# Patient Record
Sex: MALE | Race: WHITE | NOT HISPANIC OR LATINO | Employment: OTHER | URBAN - METROPOLITAN AREA
[De-identification: names, ages, dates, MRNs, and addresses within clinical notes are randomized per-mention and may not be internally consistent; named-entity substitution may affect disease eponyms.]

---

## 2019-08-07 ENCOUNTER — OFFICE VISIT CONVERTED (OUTPATIENT)
Dept: PULMONOLOGY | Facility: CLINIC | Age: 69
End: 2019-08-07
Attending: INTERNAL MEDICINE

## 2019-08-22 ENCOUNTER — HOSPITAL ENCOUNTER (OUTPATIENT)
Dept: CARDIOLOGY | Facility: HOSPITAL | Age: 69
Discharge: HOME | End: 2019-08-22
Attending: INTERNAL MEDICINE

## 2019-08-22 LAB
ALBUMIN SERPL-MCNC: 4 G/DL (ref 3.5–5)
ALBUMIN/GLOB SERPL: 1.3 {RATIO} (ref 1.4–2.6)
ALP SERPL-CCNC: 84 U/L (ref 56–155)
ALT SERPL-CCNC: 12 U/L (ref 10–40)
ANION GAP SERPL CALC-SCNC: 17 MMOL/L (ref 8–19)
AST SERPL-CCNC: 15 U/L (ref 15–50)
BASOPHILS # BLD AUTO: 0.08 10*3/UL (ref 0–0.2)
BASOPHILS NFR BLD AUTO: 0.7 % (ref 0–3)
BILIRUB SERPL-MCNC: 0.41 MG/DL (ref 0.2–1.3)
BUN SERPL-MCNC: 13 MG/DL (ref 5–25)
BUN/CREAT SERPL: 15 {RATIO} (ref 6–20)
CALCIUM SERPL-MCNC: 9.4 MG/DL (ref 8.7–10.4)
CHLORIDE SERPL-SCNC: 104 MMOL/L (ref 99–111)
CONV ABS IMM GRAN: 0.11 10*3/UL (ref 0–0.2)
CONV CO2: 23 MMOL/L (ref 22–32)
CONV IMMATURE GRAN: 1 % (ref 0–1.8)
CONV TOTAL PROTEIN: 7.1 G/DL (ref 6.3–8.2)
CREAT UR-MCNC: 0.88 MG/DL (ref 0.7–1.2)
DEPRECATED RDW RBC AUTO: 48.9 FL (ref 35.1–43.9)
EOSINOPHIL # BLD AUTO: 0.04 10*3/UL (ref 0–0.7)
EOSINOPHIL # BLD AUTO: 0.4 % (ref 0–7)
ERYTHROCYTE [DISTWIDTH] IN BLOOD BY AUTOMATED COUNT: 13.6 % (ref 11.6–14.4)
GFR SERPLBLD BASED ON 1.73 SQ M-ARVRAT: >60 ML/MIN/{1.73_M2}
GLOBULIN UR ELPH-MCNC: 3.1 G/DL (ref 2–3.5)
GLUCOSE SERPL-MCNC: 129 MG/DL (ref 70–99)
HCT VFR BLD AUTO: 43.9 % (ref 42–52)
HGB BLD-MCNC: 14.2 G/DL (ref 14–18)
LYMPHOCYTES # BLD AUTO: 2.39 10*3/UL (ref 1–5)
LYMPHOCYTES NFR BLD AUTO: 21.8 % (ref 20–45)
MCH RBC QN AUTO: 31.6 PG (ref 27–31)
MCHC RBC AUTO-ENTMCNC: 32.3 G/DL (ref 33–37)
MCV RBC AUTO: 97.8 FL (ref 80–96)
MONOCYTES # BLD AUTO: 0.65 10*3/UL (ref 0.2–1.2)
MONOCYTES NFR BLD AUTO: 5.9 % (ref 3–10)
NEUTROPHILS # BLD AUTO: 7.71 10*3/UL (ref 2–8)
NEUTROPHILS NFR BLD AUTO: 70.2 % (ref 30–85)
NRBC CBCN: 0 % (ref 0–0.7)
OSMOLALITY SERPL CALC.SUM OF ELEC: 292 MOSM/KG (ref 273–304)
PLATELET # BLD AUTO: 257 10*3/UL (ref 130–400)
PMV BLD AUTO: 9.5 FL (ref 9.4–12.4)
POTASSIUM SERPL-SCNC: 4.2 MMOL/L (ref 3.5–5.3)
RBC # BLD AUTO: 4.49 10*6/UL (ref 4.7–6.1)
SODIUM SERPL-SCNC: 140 MMOL/L (ref 135–147)
WBC # BLD AUTO: 10.98 10*3/UL (ref 4.8–10.8)

## 2019-09-27 ENCOUNTER — OFFICE VISIT CONVERTED (OUTPATIENT)
Dept: PULMONOLOGY | Facility: CLINIC | Age: 69
End: 2019-09-27
Attending: INTERNAL MEDICINE

## 2020-01-14 ENCOUNTER — OFFICE VISIT CONVERTED (OUTPATIENT)
Dept: PULMONOLOGY | Facility: CLINIC | Age: 70
End: 2020-01-14
Attending: INTERNAL MEDICINE

## 2020-03-11 ENCOUNTER — HOSPITAL ENCOUNTER (OUTPATIENT)
Dept: GENERAL RADIOLOGY | Facility: HOSPITAL | Age: 70
Discharge: HOME | End: 2020-03-11
Attending: INTERNAL MEDICINE

## 2020-04-15 ENCOUNTER — OFFICE VISIT CONVERTED (OUTPATIENT)
Dept: PULMONOLOGY | Facility: CLINIC | Age: 70
End: 2020-04-15
Attending: INTERNAL MEDICINE

## 2020-05-14 ENCOUNTER — OFFICE VISIT CONVERTED (OUTPATIENT)
Dept: PULMONOLOGY | Facility: CLINIC | Age: 70
End: 2020-05-14
Attending: PHYSICIAN ASSISTANT

## 2020-06-30 ENCOUNTER — HOSPITAL ENCOUNTER (OUTPATIENT)
Dept: GENERAL RADIOLOGY | Facility: HOSPITAL | Age: 70
Discharge: HOME | End: 2020-06-30
Attending: INTERNAL MEDICINE

## 2020-06-30 ENCOUNTER — OFFICE VISIT CONVERTED (OUTPATIENT)
Dept: PULMONOLOGY | Facility: CLINIC | Age: 70
End: 2020-06-30
Attending: INTERNAL MEDICINE

## 2020-07-01 ENCOUNTER — HOSPITAL ENCOUNTER (OUTPATIENT)
Dept: LAB | Facility: HOSPITAL | Age: 70
Discharge: HOME | End: 2020-07-01
Attending: INTERNAL MEDICINE

## 2020-07-01 LAB
ALBUMIN SERPL-MCNC: 3.8 G/DL (ref 3.5–5)
ALBUMIN/GLOB SERPL: 1.5 {RATIO} (ref 1.4–2.6)
ALP SERPL-CCNC: 91 U/L (ref 56–155)
ALT SERPL-CCNC: 7 U/L (ref 10–40)
ANION GAP SERPL CALC-SCNC: 18 MMOL/L (ref 8–19)
AST SERPL-CCNC: 10 U/L (ref 15–50)
BILIRUB SERPL-MCNC: 0.19 MG/DL (ref 0.2–1.3)
BUN SERPL-MCNC: 12 MG/DL (ref 5–25)
BUN/CREAT SERPL: 12 {RATIO} (ref 6–20)
CALCIUM SERPL-MCNC: 9.5 MG/DL (ref 8.7–10.4)
CHLORIDE SERPL-SCNC: 104 MMOL/L (ref 99–111)
CONV CO2: 24 MMOL/L (ref 22–32)
CONV TOTAL PROTEIN: 6.4 G/DL (ref 6.3–8.2)
CREAT UR-MCNC: 0.99 MG/DL (ref 0.7–1.2)
GFR SERPLBLD BASED ON 1.73 SQ M-ARVRAT: >60 ML/MIN/{1.73_M2}
GLOBULIN UR ELPH-MCNC: 2.6 G/DL (ref 2–3.5)
GLUCOSE SERPL-MCNC: 169 MG/DL (ref 70–99)
OSMOLALITY SERPL CALC.SUM OF ELEC: 298 MOSM/KG (ref 273–304)
POTASSIUM SERPL-SCNC: 4 MMOL/L (ref 3.5–5.3)
SODIUM SERPL-SCNC: 142 MMOL/L (ref 135–147)

## 2020-07-04 LAB
BACTERIA SPEC AEROBE CULT: ABNORMAL
CEFEPIME SUSC ISLT: 2
CEFOTAXIME SUSC ISLT.MENING: 1
CEFOTAXIME SUSC ISLT: 1
CEFTAZIDIME SUSC ISLT: 4
CEFTRIAXONE SUSC ISLT.MENING: 1
CEFTRIAXONE SUSC ISLT: 1
CIPROFLOXACIN SUSC ISLT: <=0.25
CLINDAMYCIN SUSC ISLT: >=1
ERYTHROMYCIN SUSC ISLT: >=8
GENTAMICIN SUSC ISLT: 8
LEVOFLOXACIN SUSC ISLT: 0.5
LEVOFLOXACIN SUSC ISLT: 0.5
TETRACYCLINE SUSC ISLT: <=0.25
TIGECYCLINE SUSC ISLT: <=0.06
TMP SMX SUSC ISLT: <=10
TOBRAMYCIN SUSC ISLT: <=1
VANCOMYCIN SUSC ISLT: 0.5

## 2020-07-09 ENCOUNTER — OFFICE VISIT CONVERTED (OUTPATIENT)
Dept: PULMONOLOGY | Facility: CLINIC | Age: 70
End: 2020-07-09
Attending: NURSE PRACTITIONER

## 2020-08-17 ENCOUNTER — HOSPITAL ENCOUNTER (OUTPATIENT)
Dept: GENERAL RADIOLOGY | Facility: HOSPITAL | Age: 70
Discharge: HOME | End: 2020-08-17
Attending: INTERNAL MEDICINE

## 2020-08-17 LAB
CREAT BLD-MCNC: 0.9 MG/DL (ref 0.6–1.4)
GFR SERPLBLD BASED ON 1.73 SQ M-ARVRAT: >60 ML/MIN/{1.73_M2}

## 2020-08-28 ENCOUNTER — OFFICE VISIT CONVERTED (OUTPATIENT)
Dept: PULMONOLOGY | Facility: CLINIC | Age: 70
End: 2020-08-28
Attending: INTERNAL MEDICINE

## 2021-01-01 ENCOUNTER — HOSPITAL ENCOUNTER (OUTPATIENT)
Dept: CT IMAGING | Facility: HOSPITAL | Age: 71
Discharge: HOME | End: 2021-08-03
Admitting: NURSE PRACTITIONER

## 2021-01-01 ENCOUNTER — ANESTHESIA (OUTPATIENT)
Dept: GASTROENTEROLOGY | Facility: HOSPITAL | Age: 71
End: 2021-01-01

## 2021-01-01 ENCOUNTER — HOSPITAL ENCOUNTER (OUTPATIENT)
Dept: CT IMAGING | Facility: HOSPITAL | Age: 71
Discharge: HOME | End: 2021-10-01

## 2021-01-01 ENCOUNTER — TELEPHONE (OUTPATIENT)
Dept: GASTROENTEROLOGY | Facility: CLINIC | Age: 71
End: 2021-01-01

## 2021-01-01 ENCOUNTER — HOSPITAL ENCOUNTER (OUTPATIENT)
Facility: HOSPITAL | Age: 71
Setting detail: HOSPITAL OUTPATIENT SURGERY
Discharge: HOME | End: 2021-12-23
Attending: INTERNAL MEDICINE | Admitting: INTERNAL MEDICINE

## 2021-01-01 ENCOUNTER — OFFICE VISIT (OUTPATIENT)
Dept: PULMONOLOGY | Facility: CLINIC | Age: 71
End: 2021-01-01

## 2021-01-01 ENCOUNTER — ANESTHESIA EVENT (OUTPATIENT)
Dept: GASTROENTEROLOGY | Facility: HOSPITAL | Age: 71
End: 2021-01-01

## 2021-01-01 ENCOUNTER — APPOINTMENT (OUTPATIENT)
Dept: CT IMAGING | Facility: HOSPITAL | Age: 71
End: 2021-01-01

## 2021-01-01 ENCOUNTER — LAB (OUTPATIENT)
Dept: LAB | Facility: HOSPITAL | Age: 71
End: 2021-01-01

## 2021-01-01 ENCOUNTER — PREP FOR SURGERY (OUTPATIENT)
Dept: OTHER | Facility: HOSPITAL | Age: 71
End: 2021-01-01

## 2021-01-01 ENCOUNTER — HOSPITAL ENCOUNTER (OUTPATIENT)
Dept: CARDIOLOGY | Facility: HOSPITAL | Age: 71
Discharge: HOME | End: 2021-10-01

## 2021-01-01 ENCOUNTER — OFFICE VISIT (OUTPATIENT)
Dept: GASTROENTEROLOGY | Facility: CLINIC | Age: 71
End: 2021-01-01

## 2021-01-01 VITALS
HEIGHT: 72 IN | SYSTOLIC BLOOD PRESSURE: 146 MMHG | WEIGHT: 138.89 LBS | TEMPERATURE: 97.7 F | HEART RATE: 74 BPM | OXYGEN SATURATION: 94 % | BODY MASS INDEX: 18.81 KG/M2 | DIASTOLIC BLOOD PRESSURE: 88 MMHG | RESPIRATION RATE: 16 BRPM

## 2021-01-01 VITALS
DIASTOLIC BLOOD PRESSURE: 58 MMHG | SYSTOLIC BLOOD PRESSURE: 132 MMHG | HEIGHT: 73 IN | TEMPERATURE: 98.2 F | HEART RATE: 67 BPM | RESPIRATION RATE: 14 BRPM | BODY MASS INDEX: 17.36 KG/M2 | WEIGHT: 131 LBS | OXYGEN SATURATION: 97 %

## 2021-01-01 VITALS
HEIGHT: 73 IN | BODY MASS INDEX: 17.23 KG/M2 | WEIGHT: 130 LBS | RESPIRATION RATE: 14 BRPM | OXYGEN SATURATION: 96 % | HEART RATE: 90 BPM | SYSTOLIC BLOOD PRESSURE: 145 MMHG | DIASTOLIC BLOOD PRESSURE: 70 MMHG

## 2021-01-01 VITALS
HEART RATE: 68 BPM | HEIGHT: 73 IN | DIASTOLIC BLOOD PRESSURE: 60 MMHG | SYSTOLIC BLOOD PRESSURE: 102 MMHG | BODY MASS INDEX: 17.97 KG/M2 | WEIGHT: 135.6 LBS

## 2021-01-01 DIAGNOSIS — R10.13 EPIGASTRIC PAIN: ICD-10-CM

## 2021-01-01 DIAGNOSIS — E43 SEVERE MALNUTRITION (HCC): ICD-10-CM

## 2021-01-01 DIAGNOSIS — J43.2 CENTRILOBULAR EMPHYSEMA (HCC): ICD-10-CM

## 2021-01-01 DIAGNOSIS — J43.2 CENTRILOBULAR EMPHYSEMA (HCC): Primary | ICD-10-CM

## 2021-01-01 DIAGNOSIS — R10.13 EPIGASTRIC PAIN: Primary | ICD-10-CM

## 2021-01-01 DIAGNOSIS — R06.89 AIRWAY CLEARANCE IMPAIRMENT: ICD-10-CM

## 2021-01-01 DIAGNOSIS — Z72.0 TOBACCO ABUSE: ICD-10-CM

## 2021-01-01 DIAGNOSIS — R42 DIZZINESS: ICD-10-CM

## 2021-01-01 DIAGNOSIS — R91.8 NONSPECIFIC ABNORMAL FINDING OF LUNG FIELD: ICD-10-CM

## 2021-01-01 DIAGNOSIS — R06.2 WHEEZING: ICD-10-CM

## 2021-01-01 DIAGNOSIS — R49.0 HOARSENESS: ICD-10-CM

## 2021-01-01 DIAGNOSIS — R63.4 UNINTENDED WEIGHT LOSS: ICD-10-CM

## 2021-01-01 DIAGNOSIS — K29.70 GASTRITIS, HELICOBACTER PYLORI: Primary | ICD-10-CM

## 2021-01-01 DIAGNOSIS — R63.4 UNINTENTIONAL WEIGHT LOSS: ICD-10-CM

## 2021-01-01 DIAGNOSIS — N28.9 RENAL LESION: ICD-10-CM

## 2021-01-01 DIAGNOSIS — R19.4 CHANGE IN BOWEL HABIT: ICD-10-CM

## 2021-01-01 DIAGNOSIS — J96.21 ACUTE AND CHRONIC RESPIRATORY FAILURE WITH HYPOXIA (HCC): ICD-10-CM

## 2021-01-01 DIAGNOSIS — E43 SEVERE PROTEIN-CALORIE MALNUTRITION (HCC): ICD-10-CM

## 2021-01-01 DIAGNOSIS — R93.1 ABNORMAL ECHOCARDIOGRAM: ICD-10-CM

## 2021-01-01 DIAGNOSIS — R91.1 LUNG NODULE: Primary | ICD-10-CM

## 2021-01-01 DIAGNOSIS — B96.81 GASTRITIS, HELICOBACTER PYLORI: Primary | ICD-10-CM

## 2021-01-01 DIAGNOSIS — T17.500A MUCUS PLUGGING OF BRONCHI: ICD-10-CM

## 2021-01-01 DIAGNOSIS — R06.09 CHRONIC DYSPNEA: ICD-10-CM

## 2021-01-01 DIAGNOSIS — J44.9 CHRONIC OBSTRUCTIVE PULMONARY DISEASE, UNSPECIFIED COPD TYPE (HCC): Primary | ICD-10-CM

## 2021-01-01 LAB
ALBUMIN SERPL-MCNC: 4.2 G/DL (ref 3.5–5.2)
ALBUMIN/GLOB SERPL: 1.6 G/DL
ALP SERPL-CCNC: 81 U/L (ref 39–117)
ALT SERPL W P-5'-P-CCNC: 6 U/L (ref 1–41)
ANION GAP SERPL CALCULATED.3IONS-SCNC: 5.5 MMOL/L (ref 5–15)
AST SERPL-CCNC: 13 U/L (ref 1–40)
BASOPHILS # BLD AUTO: 0.09 10*3/MM3 (ref 0–0.2)
BASOPHILS NFR BLD AUTO: 1 % (ref 0–1.5)
BH CV ECHO MEAS - AO MAX PG: 8 MMHG
BH CV ECHO MEAS - AO MEAN PG: 4 MMHG
BH CV ECHO MEAS - AO ROOT DIAM: 3.4 CM
BH CV ECHO MEAS - AO V2 MAX: 143 CM/SEC
BH CV ECHO MEAS - AO V2 VTI: 34.2 CM
BH CV ECHO MEAS - EDV(MOD-SP2): 78 ML
BH CV ECHO MEAS - EDV(MOD-SP4): 77 ML
BH CV ECHO MEAS - EF(MOD-BP): 61.1 %
BH CV ECHO MEAS - ESV(MOD-SP2): 31 ML
BH CV ECHO MEAS - ESV(MOD-SP4): 30 ML
BH CV ECHO MEAS - IVSD: 0.7 CM
BH CV ECHO MEAS - LA DIMENSION(2D): 2.6 CM
BH CV ECHO MEAS - LAT PEAK E' VEL: 7.1 CM/SEC
BH CV ECHO MEAS - LV MAX PG: 5 MMHG
BH CV ECHO MEAS - LV MEAN PG: 2 MMHG
BH CV ECHO MEAS - LV V1 MAX: 109 CM/SEC
BH CV ECHO MEAS - LV V1 VTI: 23.7 CM
BH CV ECHO MEAS - LVIDD: 4.7 CM
BH CV ECHO MEAS - LVIDS: 2.9 CM
BH CV ECHO MEAS - LVOT DIAM: 2 CM
BH CV ECHO MEAS - LVPWD: 0.7 CM
BH CV ECHO MEAS - MED PEAK E' VEL: 5.98 CM/SEC
BH CV ECHO MEAS - MV A MAX VEL: 77 CM/SEC
BH CV ECHO MEAS - MV DEC TIME: 181 MSEC
BH CV ECHO MEAS - MV E MAX VEL: 107 CM/SEC
BH CV ECHO MEAS - MV E/A: 1.4
BH CV ECHO MEAS - RVDD: 2.5 CM
BH CV ECHO MEAS - TR MAX PG: 42 MMHG
BH CV ECHO MEASUREMENTS AVERAGE E/E' RATIO: 16.36
BILIRUB SERPL-MCNC: 0.5 MG/DL (ref 0–1.2)
BUN SERPL-MCNC: 12 MG/DL (ref 8–23)
BUN/CREAT SERPL: 11.3 (ref 7–25)
CALCIUM SPEC-SCNC: 9.5 MG/DL (ref 8.6–10.5)
CHLORIDE SERPL-SCNC: 104 MMOL/L (ref 98–107)
CO2 SERPL-SCNC: 27.5 MMOL/L (ref 22–29)
CREAT BLDA-MCNC: 0.9 MG/DL
CREAT SERPL-MCNC: 1.06 MG/DL (ref 0.76–1.27)
CYTO UR: NORMAL
DEPRECATED RDW RBC AUTO: 44.6 FL (ref 37–54)
EOSINOPHIL # BLD AUTO: 0.17 10*3/MM3 (ref 0–0.4)
EOSINOPHIL NFR BLD AUTO: 1.9 % (ref 0.3–6.2)
ERYTHROCYTE [DISTWIDTH] IN BLOOD BY AUTOMATED COUNT: 12 % (ref 12.3–15.4)
GFR SERPL CREATININE-BSD FRML MDRD: 69 ML/MIN/1.73
GLOBULIN UR ELPH-MCNC: 2.7 GM/DL
GLUCOSE SERPL-MCNC: 79 MG/DL (ref 65–99)
HCT VFR BLD AUTO: 45 % (ref 37.5–51)
HGB BLD-MCNC: 14.7 G/DL (ref 13–17.7)
IMM GRANULOCYTES # BLD AUTO: 0.06 10*3/MM3 (ref 0–0.05)
IMM GRANULOCYTES NFR BLD AUTO: 0.7 % (ref 0–0.5)
IVRT: 58 MSEC
LAB AP CASE REPORT: NORMAL
LAB AP CLINICAL INFORMATION: NORMAL
LEFT ATRIUM VOLUME INDEX: 15.1 ML/M2
LYMPHOCYTES # BLD AUTO: 2.98 10*3/MM3 (ref 0.7–3.1)
LYMPHOCYTES NFR BLD AUTO: 32.7 % (ref 19.6–45.3)
MAXIMAL PREDICTED HEART RATE: 149 BPM
MCH RBC QN AUTO: 32.5 PG (ref 26.6–33)
MCHC RBC AUTO-ENTMCNC: 32.7 G/DL (ref 31.5–35.7)
MCV RBC AUTO: 99.6 FL (ref 79–97)
MONOCYTES # BLD AUTO: 0.71 10*3/MM3 (ref 0.1–0.9)
MONOCYTES NFR BLD AUTO: 7.8 % (ref 5–12)
NEUTROPHILS NFR BLD AUTO: 5.11 10*3/MM3 (ref 1.7–7)
NEUTROPHILS NFR BLD AUTO: 55.9 % (ref 42.7–76)
NRBC BLD AUTO-RTO: 0 /100 WBC (ref 0–0.2)
PATH REPORT.FINAL DX SPEC: NORMAL
PATH REPORT.GROSS SPEC: NORMAL
PLATELET # BLD AUTO: 211 10*3/MM3 (ref 140–450)
PMV BLD AUTO: 10.3 FL (ref 6–12)
POTASSIUM SERPL-SCNC: 4.5 MMOL/L (ref 3.5–5.2)
PROT SERPL-MCNC: 6.9 G/DL (ref 6–8.5)
RBC # BLD AUTO: 4.52 10*6/MM3 (ref 4.14–5.8)
SODIUM SERPL-SCNC: 137 MMOL/L (ref 136–145)
STRESS TARGET HR: 127 BPM
WBC # BLD AUTO: 9.12 10*3/MM3 (ref 3.4–10.8)

## 2021-01-01 PROCEDURE — 71250 CT THORAX DX C-: CPT

## 2021-01-01 PROCEDURE — 25010000002 PROPOFOL 10 MG/ML EMULSION: Performed by: NURSE ANESTHETIST, CERTIFIED REGISTERED

## 2021-01-01 PROCEDURE — 82565 ASSAY OF CREATININE: CPT

## 2021-01-01 PROCEDURE — 45385 COLONOSCOPY W/LESION REMOVAL: CPT | Performed by: INTERNAL MEDICINE

## 2021-01-01 PROCEDURE — 93306 TTE W/DOPPLER COMPLETE: CPT | Performed by: INTERNAL MEDICINE

## 2021-01-01 PROCEDURE — 99214 OFFICE O/P EST MOD 30 MIN: CPT | Performed by: INTERNAL MEDICINE

## 2021-01-01 PROCEDURE — 99214 OFFICE O/P EST MOD 30 MIN: CPT | Performed by: NURSE PRACTITIONER

## 2021-01-01 PROCEDURE — 93306 TTE W/DOPPLER COMPLETE: CPT

## 2021-01-01 PROCEDURE — 74177 CT ABD & PELVIS W/CONTRAST: CPT

## 2021-01-01 PROCEDURE — 43239 EGD BIOPSY SINGLE/MULTIPLE: CPT | Performed by: INTERNAL MEDICINE

## 2021-01-01 PROCEDURE — 85025 COMPLETE CBC W/AUTO DIFF WBC: CPT

## 2021-01-01 PROCEDURE — 36415 COLL VENOUS BLD VENIPUNCTURE: CPT

## 2021-01-01 PROCEDURE — 80053 COMPREHEN METABOLIC PANEL: CPT

## 2021-01-01 PROCEDURE — 0 IOPAMIDOL PER 1 ML: Performed by: INTERNAL MEDICINE

## 2021-01-01 PROCEDURE — C1889 IMPLANT/INSERT DEVICE, NOC: HCPCS | Performed by: INTERNAL MEDICINE

## 2021-01-01 PROCEDURE — 99406 BEHAV CHNG SMOKING 3-10 MIN: CPT | Performed by: INTERNAL MEDICINE

## 2021-01-01 PROCEDURE — 25010000002 PHENYLEPHRINE 10 MG/ML SOLUTION: Performed by: NURSE ANESTHETIST, CERTIFIED REGISTERED

## 2021-01-01 PROCEDURE — 71250 CT THORAX DX C-: CPT | Performed by: RADIOLOGY

## 2021-01-01 PROCEDURE — 88305 TISSUE EXAM BY PATHOLOGIST: CPT | Performed by: INTERNAL MEDICINE

## 2021-01-01 DEVICE — DEV CLIP ENDO RESOLUTION360 CONTRL ROT 235CM: Type: IMPLANTABLE DEVICE | Site: COLON | Status: FUNCTIONAL

## 2021-01-01 RX ORDER — BUDESONIDE, GLYCOPYRROLATE, AND FORMOTEROL FUMARATE 160; 9; 4.8 UG/1; UG/1; UG/1
2 AEROSOL, METERED RESPIRATORY (INHALATION) 2 TIMES DAILY
Qty: 4 EACH | Refills: 0 | COMMUNITY
Start: 2021-01-01 | End: 2022-01-01

## 2021-01-01 RX ORDER — PROPOFOL 10 MG/ML
VIAL (ML) INTRAVENOUS AS NEEDED
Status: DISCONTINUED | OUTPATIENT
Start: 2021-01-01 | End: 2021-01-01 | Stop reason: SURG

## 2021-01-01 RX ORDER — PREDNISONE 1 MG/1
TABLET ORAL
COMMUNITY
Start: 2021-06-16 | End: 2021-01-01

## 2021-01-01 RX ORDER — FLUTICASONE PROPIONATE 50 MCG
SPRAY, SUSPENSION (ML) NASAL AS NEEDED
COMMUNITY
Start: 2021-01-01

## 2021-01-01 RX ORDER — MECLIZINE HYDROCHLORIDE 25 MG/1
25 TABLET ORAL 3 TIMES DAILY PRN
COMMUNITY
End: 2022-01-01

## 2021-01-01 RX ORDER — CLOPIDOGREL BISULFATE 75 MG/1
75 TABLET ORAL DAILY
COMMUNITY
Start: 2021-06-30

## 2021-01-01 RX ORDER — ETOMIDATE 2 MG/ML
INJECTION INTRAVENOUS AS NEEDED
Status: DISCONTINUED | OUTPATIENT
Start: 2021-01-01 | End: 2021-01-01 | Stop reason: SURG

## 2021-01-01 RX ORDER — TRIAMCINOLONE ACETONIDE 1 MG/G
CREAM TOPICAL
COMMUNITY
Start: 2021-05-25 | End: 2021-01-01

## 2021-01-01 RX ORDER — SULFAMETHOXAZOLE AND TRIMETHOPRIM 800; 160 MG/1; MG/1
TABLET ORAL
COMMUNITY
Start: 2021-05-25 | End: 2021-01-01

## 2021-01-01 RX ORDER — LABETALOL HYDROCHLORIDE 5 MG/ML
INJECTION, SOLUTION INTRAVENOUS AS NEEDED
Status: DISCONTINUED | OUTPATIENT
Start: 2021-01-01 | End: 2021-01-01 | Stop reason: SURG

## 2021-01-01 RX ORDER — BISMUTH SUBSALICYLATE 262 MG
524 TABLET,CHEWABLE ORAL 4 TIMES DAILY
Qty: 80 TABLET | Refills: 0 | Status: SHIPPED | OUTPATIENT
Start: 2021-01-01 | End: 2022-01-01

## 2021-01-01 RX ORDER — PANTOPRAZOLE SODIUM 40 MG/1
40 TABLET, DELAYED RELEASE ORAL DAILY
Qty: 30 TABLET | Refills: 5 | Status: SHIPPED | OUTPATIENT
Start: 2021-01-01 | End: 2022-01-01

## 2021-01-01 RX ORDER — ALBUTEROL SULFATE 2.5 MG/3ML
2.5 SOLUTION RESPIRATORY (INHALATION) EVERY 4 HOURS PRN
COMMUNITY

## 2021-01-01 RX ORDER — ROSUVASTATIN CALCIUM 10 MG/1
10 TABLET, COATED ORAL DAILY
COMMUNITY
Start: 2021-01-01

## 2021-01-01 RX ORDER — PREDNISONE 10 MG/1
10 TABLET ORAL DAILY
COMMUNITY
Start: 2021-01-01 | End: 2021-01-01

## 2021-01-01 RX ORDER — SILDENAFIL CITRATE 20 MG/1
TABLET ORAL
COMMUNITY
Start: 2021-01-01

## 2021-01-01 RX ORDER — METRONIDAZOLE 500 MG/1
500 TABLET ORAL 3 TIMES DAILY
Qty: 30 TABLET | Refills: 0 | Status: SHIPPED | OUTPATIENT
Start: 2021-01-01 | End: 2022-01-01

## 2021-01-01 RX ORDER — TRAZODONE HYDROCHLORIDE 300 MG/1
300 TABLET ORAL NIGHTLY
COMMUNITY
Start: 2021-01-01

## 2021-01-01 RX ORDER — PHENYLEPHRINE HYDROCHLORIDE 10 MG/ML
INJECTION INTRAVENOUS AS NEEDED
Status: DISCONTINUED | OUTPATIENT
Start: 2021-01-01 | End: 2021-01-01 | Stop reason: SURG

## 2021-01-01 RX ORDER — SIMVASTATIN 40 MG
TABLET ORAL
COMMUNITY
Start: 2021-01-01 | End: 2021-01-01 | Stop reason: ALTCHOICE

## 2021-01-01 RX ORDER — LIDOCAINE HYDROCHLORIDE 20 MG/ML
INJECTION, SOLUTION INFILTRATION; PERINEURAL AS NEEDED
Status: DISCONTINUED | OUTPATIENT
Start: 2021-01-01 | End: 2021-01-01 | Stop reason: SURG

## 2021-01-01 RX ORDER — CLONAZEPAM 0.5 MG/1
0.5 TABLET ORAL NIGHTLY PRN
COMMUNITY
Start: 2021-01-01

## 2021-01-01 RX ORDER — OMEPRAZOLE 20 MG/1
20 CAPSULE, DELAYED RELEASE ORAL DAILY
COMMUNITY
Start: 2021-01-01 | End: 2021-01-01 | Stop reason: HOSPADM

## 2021-01-01 RX ORDER — POLYETHYLENE GLYCOL 3350, SODIUM SULFATE ANHYDROUS, SODIUM BICARBONATE, SODIUM CHLORIDE, POTASSIUM CHLORIDE 227.1; 21.5; 6.36; 5.53; .754 G/L; G/L; G/L; G/L; G/L
4 POWDER, FOR SOLUTION ORAL DAILY
Qty: 1 EACH | Refills: 0 | Status: SHIPPED | OUTPATIENT
Start: 2021-01-01 | End: 2021-01-01

## 2021-01-01 RX ORDER — TETRACYCLINE HYDROCHLORIDE 500 MG/1
500 CAPSULE ORAL 3 TIMES DAILY
Qty: 30 CAPSULE | Refills: 0 | Status: SHIPPED | OUTPATIENT
Start: 2021-01-01 | End: 2022-01-01

## 2021-01-01 RX ORDER — SODIUM CHLORIDE, SODIUM LACTATE, POTASSIUM CHLORIDE, CALCIUM CHLORIDE 600; 310; 30; 20 MG/100ML; MG/100ML; MG/100ML; MG/100ML
30 INJECTION, SOLUTION INTRAVENOUS CONTINUOUS
Status: DISCONTINUED | OUTPATIENT
Start: 2021-01-01 | End: 2021-01-01 | Stop reason: HOSPADM

## 2021-01-01 RX ORDER — DILTIAZEM HYDROCHLORIDE 120 MG/1
120 CAPSULE, COATED, EXTENDED RELEASE ORAL DAILY
COMMUNITY
Start: 2021-01-01

## 2021-01-01 RX ORDER — PROPRANOLOL HYDROCHLORIDE 80 MG/1
80 TABLET ORAL 4 TIMES DAILY PRN
COMMUNITY
Start: 2021-01-01

## 2021-01-01 RX ORDER — HYDROCODONE BITARTRATE AND ACETAMINOPHEN 10; 325 MG/1; MG/1
1 TABLET ORAL EVERY 8 HOURS PRN
COMMUNITY
Start: 2021-01-01

## 2021-01-01 RX ADMIN — PROPOFOL 125 MCG/KG/MIN: 10 INJECTION, EMULSION INTRAVENOUS at 12:04

## 2021-01-01 RX ADMIN — IOPAMIDOL 100 ML: 755 INJECTION, SOLUTION INTRAVENOUS at 10:52

## 2021-01-01 RX ADMIN — PHENYLEPHRINE HYDROCHLORIDE 100 MCG: 10 INJECTION INTRAVENOUS at 12:38

## 2021-01-01 RX ADMIN — PROPOFOL 50 MG: 10 INJECTION, EMULSION INTRAVENOUS at 12:04

## 2021-01-01 RX ADMIN — ETOMIDATE 10 MG: 40 INJECTION, SOLUTION INTRAVENOUS at 12:16

## 2021-01-01 RX ADMIN — LABETALOL 20 MG/4 ML (5 MG/ML) INTRAVENOUS SYRINGE 10 MG: at 12:14

## 2021-01-01 RX ADMIN — ETOMIDATE 10 MG: 40 INJECTION, SOLUTION INTRAVENOUS at 12:20

## 2021-01-01 RX ADMIN — SODIUM CHLORIDE, POTASSIUM CHLORIDE, SODIUM LACTATE AND CALCIUM CHLORIDE 30 ML/HR: 600; 310; 30; 20 INJECTION, SOLUTION INTRAVENOUS at 10:54

## 2021-01-01 RX ADMIN — LIDOCAINE HYDROCHLORIDE 100 MG: 20 INJECTION, SOLUTION INFILTRATION; PERINEURAL at 12:03

## 2021-02-25 ENCOUNTER — OFFICE VISIT CONVERTED (OUTPATIENT)
Dept: PULMONOLOGY | Facility: CLINIC | Age: 71
End: 2021-02-25
Attending: NURSE PRACTITIONER

## 2021-05-22 ENCOUNTER — TRANSCRIBE ORDERS (OUTPATIENT)
Dept: PULMONOLOGY | Facility: CLINIC | Age: 71
End: 2021-05-22

## 2021-05-22 DIAGNOSIS — R91.8 NONSPECIFIC ABNORMAL FINDING OF LUNG FIELD: Primary | ICD-10-CM

## 2021-05-28 VITALS
HEIGHT: 73 IN | HEART RATE: 76 BPM | OXYGEN SATURATION: 96 % | WEIGHT: 132 LBS | DIASTOLIC BLOOD PRESSURE: 60 MMHG | SYSTOLIC BLOOD PRESSURE: 111 MMHG | TEMPERATURE: 98.2 F | RESPIRATION RATE: 14 BRPM | BODY MASS INDEX: 17.49 KG/M2

## 2021-05-28 VITALS
SYSTOLIC BLOOD PRESSURE: 109 MMHG | OXYGEN SATURATION: 96 % | BODY MASS INDEX: 17.24 KG/M2 | TEMPERATURE: 98.1 F | RESPIRATION RATE: 12 BRPM | RESPIRATION RATE: 16 BRPM | HEART RATE: 74 BPM | RESPIRATION RATE: 15 BRPM | SYSTOLIC BLOOD PRESSURE: 92 MMHG | HEART RATE: 67 BPM | OXYGEN SATURATION: 95 % | SYSTOLIC BLOOD PRESSURE: 141 MMHG | BODY MASS INDEX: 17.23 KG/M2 | TEMPERATURE: 98.2 F | DIASTOLIC BLOOD PRESSURE: 64 MMHG | SYSTOLIC BLOOD PRESSURE: 124 MMHG | HEIGHT: 73 IN | BODY MASS INDEX: 17.49 KG/M2 | RESPIRATION RATE: 14 BRPM | HEART RATE: 91 BPM | DIASTOLIC BLOOD PRESSURE: 67 MMHG | HEIGHT: 73 IN | OXYGEN SATURATION: 96 % | WEIGHT: 132 LBS | HEIGHT: 73 IN | BODY MASS INDEX: 17.36 KG/M2 | DIASTOLIC BLOOD PRESSURE: 74 MMHG | DIASTOLIC BLOOD PRESSURE: 59 MMHG | TEMPERATURE: 98.3 F | SYSTOLIC BLOOD PRESSURE: 104 MMHG | HEIGHT: 73 IN | TEMPERATURE: 98.2 F | OXYGEN SATURATION: 91 % | TEMPERATURE: 97.9 F | HEART RATE: 82 BPM | WEIGHT: 136.56 LBS | WEIGHT: 130.12 LBS | HEART RATE: 103 BPM | RESPIRATION RATE: 14 BRPM | DIASTOLIC BLOOD PRESSURE: 72 MMHG | WEIGHT: 130 LBS | OXYGEN SATURATION: 95 % | HEIGHT: 73 IN | BODY MASS INDEX: 18.1 KG/M2 | WEIGHT: 131 LBS

## 2021-05-28 VITALS
OXYGEN SATURATION: 96 % | BODY MASS INDEX: 18.29 KG/M2 | WEIGHT: 138 LBS | HEART RATE: 79 BPM | SYSTOLIC BLOOD PRESSURE: 103 MMHG | TEMPERATURE: 98.3 F | DIASTOLIC BLOOD PRESSURE: 62 MMHG | RESPIRATION RATE: 16 BRPM | HEIGHT: 73 IN

## 2021-05-28 NOTE — PROGRESS NOTES
Patient: HERNESTO MCKEON     Acct: [id number]     Report: #[id number]  UNIT #: [id number]     : 1950    Encounter Date:2020  PRIMARY CARE: DANIA GROSSMAN  ***Signed***  --------------------------------------------------------------------------------------------------------------------  Chief Complaint      Encounter Date      2020            Primary Care Provider            dnaia grossman            Referring Provider            dania grossman            Patient Complaint      Patient is complaining of      f/u sarcodosis            VITALS      Height 6 ft 1 in / 185.42 cm      Weight 130 lbs 0 oz / 58.392451 kg      BSA 1.79 m2      BMI 17.2 kg/m2      Temperature 97.9 F / 36.61 C - Oral      Pulse 82      Respirations 12      Blood Pressure 124/64 Sitting, Right Arm      Pulse Oximetry 96%, roomair      Initial Exhaled Nitrous Oxide      Date:  Aug 7, 2019      Exhaled Nitrous Oxide Results:  0            HPI      The patient is 69 year old  male cigarettes smoker with chronic     obstructive pulmonary disease here for follow up.             Since his last office visit he has been doing well, I seen no evidence of     sarcoidosis on imaging and I think he just has chronic bronchitis. He is on tr    elegy ellipta inhaler and has been doing somewhat better. He walks about 500-600    feet and can go up about 1 flight of steps before having to stop because of     shortness of breath. It is mild to moderate in severity, worse with exertion,     relieved with rest. His biggest issue is his cough, he has a chronic cough and     brings up copious amounts of thick yellow sputum daily. He has no issues with     secretions being stuck. He states he sees is primary care provider about every     2-3 weeks and gets a steroid burst or steroid injection or steroid taper. He has    never been on chronic daily azithromycin or Daliresp. His weight is stable and     his BMI is 17.2.  He is down to 1 pack of  cigarettes per day and was previously     smoking 2 pack per day. He denies nausea or vomiting, fever or chills, headaches    and hemoptysis or chest pain. He is able to perform his ADL's without difficulty    and denies any swollen glands in his lymph nodes, head or neck.            I personally reviewed Review of Systems, family, social, surgical and medical     history and agree with their findings.            ROS      Constitutional:  Denies: Fatigue, Fever, Weight gain, Weight loss, Chills,     Insomnia, Other      Respiratory/Breathing:  Complains of: Shortness of air, Wheezing, Cough; Denies:    Hemoptysis, Pleuritic pain, Other      Endocrine:  Denies: Polydipsia, Polyuria, Heat/cold intolerance, Diabetes, Other      Eyes:  Denies: Blurred vision, Vision Changes, Other      Ears, nose, mouth, throat:  Denies: Mouth lesions, Thrush, Throat pain,     Hoarseness, Allergies/Hay Fever, Post Nasal Drip, Headaches, Recent Head Injury,    Nose Bleeding, Neck Stiffness, Thyroid Mass, Hearing Loss, Ear Fullness, Dry     Mouth, Nasal or Sinus Pain, Dry Lips, Nasal discharge, Nasal congestion, Other      Cardiovascular:  Denies: Palpitations, Syncope, Claudication, Chest Pain, Wake     up Gasping for air, Leg Swelling, Irregular Heart Rate, Cyanosis, Dyspnea on     Exertion, Other      Gastrointestinal:  Denies: Nausea, Constipation, Diarrhea, Abdominal pain,     Vomiting, Difficulty Swallowing, Reflux/Heartburn, Dysphagia, Jaundice,     Bloating, Melena, Bloody stools, Other      Genitourinary:  Denies: Urinary frequency, Incontinence, Hematuria, Urgency,     Nocturia, Dysuria, Testicular problems, Other      Musculoskeletal:  Denies: Joint Pain, Joint Stiffness, Joint Swelling, Myalgias,    Other      Hematologic/lymphatic:  DENIES: Lymphadenopathy, Bruising, Bleeding tendencies,     Other      Neurological:  Denies: Headache, Numbness, Weakness, Seizures, Other      Psychiatric:  Denies: Anxiety, Appropriate Effect,  Depression, Other      Sleep:  No: Excessive daytime sleep, Morning Headache?, Snoring, Insomnia?, Stop    breathing at sleep?, Other      Integumentary:  Denies: Rash, Dry skin, Skin Warm to Touch, Other      Immunologic/Allergic:  Denies: Latex allergy, Seasonal allergies, Asthma,     Urticaria, Eczema, Other      Immunization status:  No: Up to date            FAMILY/SOCIAL/MEDICAL HX      Surgical History:  Yes: CABG (aorta repair), Orthopedic Surgery (/ left arm     crushed/ johny in right leg), Throat Surgery (right side vocal chords paralysis)      Heart - Family Hx:  Brother      Diabetes - Family Hx:  Brother      Cancer/Type - Family Hx:  Mother, Father, Brother, Sister      Is Father Still Living?:  No      Is Mother Still Living?:  No       Family History:  Yes      Social History:  Tobacco Use; No Alcohol Use, No Recreational Drug use      Smoking status:  Current every day smoker (1 ppd x 55y)      Anticoagulation Therapy:  No      Antibiotic Prophylaxis:  No      Medical History:  Yes: Stroke (2018), Miscellaneous Medical/oth (sarcoidosis);     No: Sinus Trouble      Psychiatric History      none            PREVENTION      Hx Influenza Vaccination:  Yes      Date Influenza Vaccine Given:  Nov 1, 2019      2 or More Falls Past Year?:  No      Fall Past Year with Injury?:  No      Hx Pneumococcal Vaccination:  Yes      Encouraged to follow-up with:  PCP regarding preventative exams.      Chart initiated by      bernie/ ma            ALLERGIES/MEDICATIONS      Allergies:        Coded Allergies:             NO KNOWN ALLERGIES (Unverified , 1/14/20)      Medications    Last Reconciled on 1/14/20 15:52 by MALENA CEE MD      Roflumilast (Daliresp) 500 Mcg Tab      500 MCG PO QDAY for 30 Days, #30 TAB 3 Refills         Prov: MALENA CEE         1/14/20       buPROPion HCl (Wellbutrin) 75 Mg Tablet      150 MG PO BID, #120 TAB 3 Refills         Prov: MALENA CEE         9/27/19        Fluticasone/Umeclidin/Vilanter (Trelegy Ellipta 100-62.5-25) 1 Each Blst.w.dev      1 PUFF INH RTQDAY, #1 INH 3 Refills         Prov: MALENA CEE         9/27/19       Montelukast Sodium (Singulair*) 10 Mg Tablet      10 MG PO HS, #30 TAB 0 Refills         Reported         8/7/19       Rosuvastatin Calcium (Crestor*) 10 Mg Tablet      10 MG PO HS, #30 TAB 0 Refills         Reported         8/7/19       traZODone HCl (traZODone HCl) 150 Mg Tablet      150 MG PO HS, #30 TAB 0 Refills         Reported         8/7/19       Pantoprazole (Protonix*) 40 Mg Tablet.dr      40 MG PO HS, #30 TAB 0 Refills         Reported         8/7/19       dilTIAZem 24HR ER (XR) (dilTIAZem 24HR ER (XR)) 120 Mg Cap.er.deg      120 MG PO QDAY, CAP.ER         Reported         8/7/19       Aspirin EC (Aspirin EC) 81 Mg Tablet.dr      81 MG PO QDAY, #30 TAB.EC 0 Refills         Reported         8/7/19       Clopidogrel Bisulfate (Plavix) 75 Mg Tablet      75 MG PO QDAY, #30 TAB 0 Refills         Reported         8/7/19       MDI-Albuterol (Ventolin HFA) 18 Gm Hfa.aer.ad      2 PUFFS INH Q4H PRN for SHORTNESS OF BREATH, #1 INH 0 Refills         Reported         8/7/19      Current Medications      Current Medications Reviewed 1/14/20            EXAM      Vital Signs Reviewed      Gen: Thin and chronically ill appearing.  Alert, NAD.        HEENT:  PERRL, EOMI.  OP, nares clear, no sinus tenderness.      Neck:  Supple, no JVD, no thyromegaly.      Chest:  Good aeration, barrel chested, quiet breath sounds, tympanic to     percussion bilaterally, no work of breathing noted.      CV:  RRR, no MGR, pulses 2+, equal.      Abd:  Soft, NT, ND, + BS, no HSM.      EXT:  No clubbing, no cyanosis, no edema, muscle wasting noted at all 4     extremities.       Neuro:  A  Skin: No rashes or lesions.      Vtials      Vitals:             Height 6 ft 1 in / 185.42 cm           Weight 130 lbs 0 oz / 58.696586 kg           BSA 1.79 m2           BMI 17.2 kg/m2            Temperature 97.9 F / 36.61 C - Oral           Pulse 82           Respirations 12           Blood Pressure 124/64 Sitting, Right Arm           Pulse Oximetry 96%, roomair            REVIEW      Results Reviewed      PCCS Results Reviewed?:  Yes Previous [facility] Records            Assessment      COPD (chronic obstructive pulmonary disease)         Mucopurulent chronic bronchitis - J41.1         COPD type: chronic bronchitis         Chronic bronchitis type: mucopurulent            Lung infiltrate on CT - R91.8            Sarcoidosis - D86.9            Tobacco abuse, 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 Chronic dyspnea.       2. Chronic cough.       3. Moderate chronic obstructive pulmonary disease with chronic bronchitis     phenotype and frequent exacerbations. FEV1 is 69% on triple inhaler therapy and     still exacerbating every 2-3 weeks. Chronic obstructive pulmonary disease     assessment test score is 32, would benefit from Daliresp.       4. Tobacco abuse of cigarettes.       5. Moderate protein calorie malnutrition and muscle wasting, BMI 17.2.            PLAN:      1. Continue trelegy ellipta inhaler 1 puff daily.       2. Start Daliresp 250 mg daily X 1 month then increase to 500 mg afterward. I     informed him to take it with food. GI sided effects were discussed with the     patient. If he has any of these issues they will go away in about a week and he     needs to call us so we can give him medication for vomiting and diarrhea.       3. Smoking cessation counseling provided.  I spent 4 minutes today counseling     the patient on risks of smoking, including throat cancer, lung cancer, COPD,     heart disease and death. I also discussed the benefits of  quitting.        4. I encouraged 3000 calorie a day diet and 30 minutes of daily exercise.       5. Up to date with  flu, Prevnar and Pneumovax.         6. We will check noncontrast CT scan of the chest for persistent left lung     infiltrate and if he is still having respiratory symptoms we will take the     patient for bronchoscopy.       7. Follow up with me in 3 months.            Patient Education      ACO BMI LOW BELOW 18.5:  Counseling given, Encouraged dietary supplements      Tobacco Cessation Counseling:  for 3 - 10 minutes      Patient Education Provided:  COPD, Smoking Cessation            Patient Education:        Chronic Obstructive Pulmonary Disease      Sarcoidosis            Electronically signed by MALENA CEE  01/15/2020 10:50       Disclaimer: Converted document may not contain table formatting or lab diagrams. Please see [employer] [facility] for the authenticated document.

## 2021-05-28 NOTE — PROGRESS NOTES
Patient: HERNESTO GREENE     Acct: [id number]     Report: #[id number]  UNIT #: [id number]     : 1950    Encounter Date:2020  PRIMARY CARE: GABRIELLA MONTERROSO  ***Signed***  --------------------------------------------------------------------------------------------------------------------  TELEHEALTH NOTE      History of Present Illness            Chief Complaint: 4 week f/u            Hernesto Greene is presenting for evaluation via Telehealth visit. Verbal consent     obtained before beginning visit.            Provider spent (21) minutes with the patient during telehealth visit.            The following staff were present during the visit: Deborah Pino PA-C, Wendy Ortega CMA            The patient is a [facility] 69 year old male patient of Dr. Haro's last seen by     him for a TeleHealth one month ago. He has a history of COPD, and prior to     seeing Dr. Haro last had repeatedly been given prednisone bursts for     exacerbations. He had a chest CT done in early 2020 showing left lower lobe     mucus plugging. Dr. Haro changed him from Trelegy ellipta to Brovana, Pulm    icort and Yupelri.  The patient Brovandana was changed to Perforomist and this was     more affordable and he is suppose to be getting these through [facility], but     patient's wife reports that they are still between 300-500 dollars per month for     these medications which seem high.  The patient can tell that the nebulizers     are helping. He is unable to tell me what percent better he is feeling. His wife     assists with providing some of the history and states that he had a bad night     last night.  He was coughing and felt like he could not cough up his phlegm all     of the way.  He did complete 21 days of fluconazole for oral thrush and tells me     that that has resolved. He is still smoking one pack per day of cigarettes and     is unsure if he wants to quit. When patient does cough up phlegm it is light     yellow which  is his baseline. He denies any hemoptysis, fever, chills, purulent     sputum production or wheezing.            I have reviewed ROS, medical, surgical and family history and agree with those     as entered in the chart.  I personally reviewed previous lab work, imaging and     provider notes.                           Past Med History      COPD, Sarcoidosis      Current smoker ( 1 ppd x 55 yrs)      Flu-Current      Pneumonia-Current      Overview of Symptoms      Complaints-SOA, cough, wheezing, headache      Denies- fever, chills            Allergies/Medications      Allergies:        Coded Allergies:             NO KNOWN ALLERGIES (Unverified , 1/14/20)      Medications    Last Reconciled on 5/14/20 14:54 by HILTON PAZ      MDI-Albuterol (Proair HFA) 8.5 Gm Hfa.aer.ad      2 PUFFS INH Q4-6H PRN for SHORTNESS OF BREATH, #1 MDI 5 Refills         Reported         5/14/20       Neb-NaCl 3% (Sodium Chloride 3% Neb) 4 Ml Vial.neb      4 ML INH RTBID for 30 Days, #60 NEB 5 Refills         Prov: Nini Pino PA-C         5/14/20       Neb-Budesonide (Budesonide) 0.5 Mg/2 Ml Ampul.neb      0.5 MG INH RTBID, #60 NEB 11 Refills         Prov: MALENA CEE         5/5/20       Formoterol Fumarate (Perforomist) 20 Mcg/2 Ml Vial.neb      20 MCG INH BID, #60 NEB 11 Refills         Prov: MALENA CEE         5/5/20       Revefenacin (YUPELRI) 175 Mcg/3 Ml Nebu      175 MCG INH RTQDAY for 30 Days, #30 NEB 11 Refills         Prov: MALENA CEE         5/5/20       NEB-Albuterol Sulf (Albuterol Sulfate) 5 Mg/1 Ml Solution      2.5 MG INH RTTID, #2 BOTTLE 0 Refills         Reported         4/15/20       Roflumilast (Daliresp) 500 Mcg Tab      500 MCG PO QDAY for 30 Days, #30 TAB 3 Refills         Prov: MALENA CEE         1/14/20       buPROPion HCl (Wellbutrin) 75 Mg Tablet      150 MG PO BID, #120 TAB 3 Refills         Prov: MALENA CEE         9/27/19       Montelukast Sodium (Singulair*) 10 Mg Tablet      10  MG PO HS, #30 TAB 0 Refills         Reported         8/7/19       Rosuvastatin Calcium (Crestor*) 10 Mg Tablet      10 MG PO HS, #30 TAB 0 Refills         Reported         8/7/19       traZODone HCl (traZODone HCl) 150 Mg Tablet      150 MG PO HS, #30 TAB 0 Refills         Reported         8/7/19       Pantoprazole (Protonix) 40 Mg Tablet.dr      40 MG PO HS, #30 TAB 0 Refills         Reported         8/7/19       dilTIAZem 24Hr ER (dilTIAZem 24Hr ER) 120 Mg Cap.er.deg      120 MG PO QDAY, CAP.ER         Reported         8/7/19       Aspirin EC (Aspirin EC) 81 Mg Tablet.dr      81 MG PO QDAY, #30 TAB.EC 0 Refills         Reported         8/7/19       Clopidogrel Bisulfate (Plavix) 75 Mg Tablet      75 MG PO QDAY, #30 TAB 0 Refills         Reported         8/7/19            Plan/Instructions      Ambulatory Assessment/Plan:        Notes      New Medications      * Neb-NaCl 3% (Sodium Chloride 3% Neb) 4 ML VIAL.NEB: 4 ML INH RTBID 30 Days #60      * MDI-Albuterol (Proair HFA) 8.5 GM HFA.AER.AD: 2 PUFFS INH Q4-6H PRN SHORTNESS       OF BREATH #1      Discontinued Medications      * Fluconazole 100 MG TABLET: 100 MG PO QDAY #21      * REVEFENACIN (YUPELRI) 175 MCG/3 ML NEBU          Sample - Qty 4      * Formoterol Fumarate (Perforomist) 20 MCG/2 ML VIAL.NEB          Sample - Qty 7      * REVEFENACIN (YUPELRI) 175 MCG/3 ML NEBU          Sample - Qty 4      * ARFORMOTEROL TARTRATE (Brovana) 15 MCG/2 ML VIAL.NEB          Sample - Qty 6      Plan/Instructions            * Plan Of Care: ()            * Chronic conditions reviewed and taken into consideration for today's treatment       plan.      * Patient instructed to seek medical attention urgently for new or worsening       symptoms.      * Patient was educated/instructed on their diagnosis, treatment and medications       prior to discharge from the clinic today.            ASSESSMENT:       1. Moderate COPD without acute exacerbation.        2. History of mucus  plugging with issues of airway clearance.      3. Ongoing heavy tobacco abuse with cigarettes, still smoking one pack per day     longstanding.      4.  Dyspnea, improving.      5. Chronic cough.      6. Moderate protein calorie malnutrition and muscle wasting historically.            PLAN:      1.  At this time I have discussed with patient and his wife in detail and have     answered all of their questions today. I would have patient continue on triple     nebulizer therapy with Perforomist, Pulmicort and Yupelri.  I will see if our     staff can assist with checking on cost of these through [facility] to see if     anything can be done to make these more affordable. Given his ongoing issues     with airway clearance, I will add 3% saline nebs and have him use an albuterol     neb followed by 3% saline plus flutter valve everyday.  We will have him set up     with the flutter valve.      2. I did advise patient that I am concerned his lung function will worsen and     respiratory symptoms will worsen if he continues to smoke. I counseled the     patient on smoking cessation for 5 minutes, offered nicotine replacement therapy     and  pharmacotherapy and he declines.  I discussed with the patient that I am     concerned that lung function will continue to decline, respiratory symptoms will     worsen and he is at increased risk of cardiovascular disease and various ca    ncers if he continues to smoke.         3. I have advised patient and wife to call for any new or worsening symptoms.      4. If he continues to have issues with airway clearance despite the addition of     3% saline nebs and flutter valve, he may need bronchoscopy or may need vest     therapy.      5. Follow up in one month with Dr. Haro to reassess symptoms, sooner if     needed.      Codes:  Phone Eval 21-30 mi [id number]            Electronically signed by STORMY WATSON PA-C  06/02/2020 14:52       Disclaimer: Converted document may not contain  table formatting or lab diagrams. Please see [employer] [facility] for the authenticated document.

## 2021-05-28 NOTE — PROGRESS NOTES
Patient: HERNESTO MCKEON     Acct: [id number]     Report: #[id number]  UNIT #: [id number]     : 1950    Encounter Date:2019  PRIMARY CARE: DANIA GROSSMAN  ***Signed***  --------------------------------------------------------------------------------------------------------------------  Chief Complaint      Encounter Date      Sep 27, 2019            Primary Care Provider            dania grossman            Referring Provider            dania grossman            Patient Complaint      Patient is complaining of      F/U CT RESULTS            VITALS      Height 6 ft 1 in / 185.42 cm      Weight 130 lbs 2 oz / 59.996849 kg      BSA 1.79 m2      BMI 17.2 kg/m2      Temperature 98.1 F / 36.72 C - Oral      Pulse 103      Respirations 14      Blood Pressure 92/72 Sitting, Right Arm      Pulse Oximetry 91%, ROOMAIR      Initial Exhaled Nitrous Oxide      Date:  Aug 7, 2019      Exhaled Nitrous Oxide Results:  0            HPI      The patient is a very pleasant 69 year old  male cigarettes smoker here    for follow up for test results.             Since his last office visit I did alpha 1 antitrypsin testing with a normal     genotype of MM. I received reports from pathology showing nonnecrotizing     granulomatous inflammation from [place]. CT scan done at that time showed     bulky adenopathy. I have since stopped his steroids. CT scan of the chest shows     no lymphadenopathy but did show multiple calcified lymph nodes and severe     emphysema. Pulmonary function test were done showing moderate air flow     obstruction with FEV1 of 69% predicted and no desaturations noted on 6 minute     walk test. He is on incruse and felt better with breo.  He does have     breathlessness and gets short of breath walking about 500 feet or up a flight of    steps. It is worse with exertion, moderate in severity and relieved with rest.     He has an occasional cough that is dry with no sputum production or  hemoptysis.     He denies any wheezing or chest pain. He denies any nausea or vomiting, fever or    chills or headaches. He is malnourished and has a poor appetite and a BMI of     17.2.  He is able to perform his ADL's without difficulty and denies any swollen    glands in his lymph nodes, head or neck. Unfortunately he is still smoking 1     pack of cigarettes a day and he cannot afford chantix.             I personally reviewed Review of Systems, family, social, surgical and medical     history and agree with their findings.            ROS      Constitutional:  Denies: Fatigue, Fever, Weight gain, Weight loss, Chills,     Insomnia, Other      Respiratory/Breathing:  Complains of: Shortness of air, Wheezing, Cough; Denies:    Hemoptysis, Pleuritic pain, Other      Endocrine:  Denies: Polydipsia, Polyuria, Heat/cold intolerance, Diabetes, Other      Eyes:  Denies: Blurred vision, Vision Changes, Other      Ears, nose, mouth, throat:  Denies: Mouth lesions, Thrush, Throat pain,     Hoarseness, Allergies/Hay Fever, Post Nasal Drip, Headaches, Recent Head Injury,    Nose Bleeding, Neck Stiffness, Thyroid Mass, Hearing Loss, Ear Fullness, Dry     Mouth, Nasal or Sinus Pain, Dry Lips, Nasal discharge, Nasal congestion, Other      Cardiovascular:  Denies: Palpitations, Syncope, Claudication, Chest Pain, Wake     up Gasping for air, Leg Swelling, Irregular Heart Rate, Cyanosis, Dyspnea on     Exertion, Other      Gastrointestinal:  Denies: Nausea, Constipation, Diarrhea, Abdominal pain,     Vomiting, Difficulty Swallowing, Reflux/Heartburn, Dysphagia, Jaundice, Blo    ating, Melena, Bloody stools, Other      Genitourinary:  Denies: Urinary frequency, Incontinence, Hematuria, Urgency,     Nocturia, Dysuria, Testicular problems, Other      Musculoskeletal:  Denies: Joint Pain, Joint Stiffness, Joint Swelling, Myalgias,    Other      Hematologic/lymphatic:  DENIES: Lymphadenopathy, Bruising, Bleeding tendencies,     Other       Neurological:  Denies: Headache, Numbness, Weakness, Seizures, Other      Psychiatric:  Denies: Anxiety, Appropriate Effect, Depression, Other      Sleep:  No: Excessive daytime sleep, Morning Headache?, Snoring, Insomnia?, Stop    breathing at sleep?, Other      Integumentary:  Denies: Rash, Dry skin, Skin Warm to Touch, Other      Immunologic/Allergic:  Denies: Latex allergy, Seasonal allergies, Asthma,     Urticaria, Eczema, Other      Immunization status:  No: Up to date            FAMILY/SOCIAL/MEDICAL HX      Surgical History:  Yes: CABG (aorta repair), Orthopedic Surgery (/ left arm     crushed/ johny in right leg), Throat Surgery (right side vocal chords paralysis)      Heart - Family Hx:  Brother      Diabetes - Family Hx:  Brother      Cancer/Type - Family Hx:  Mother, Father, Brother, Sister      Is Father Still Living?:  No      Is Mother Still Living?:  No       Family History:  Yes      Social History:  Tobacco Use; No Alcohol Use, No Recreational Drug use      Smoking status:  Current every day smoker (1 ppd x 55y)      Anticoagulation Therapy:  No      Antibiotic Prophylaxis:  No      Medical History:  Yes: Stroke (2018), Miscellaneous Medical/oth (sarcoidosis);     No: Sinus Trouble      Psychiatric History      NONE            PREVENTION      Hx Influenza Vaccination:  Yes      Date Influenza Vaccine Given:  Nov 1, 2018      2 or More Falls Past Year?:  No      Fall Past Year with Injury?:  No      Hx Pneumococcal Vaccination:  Yes      Encouraged to follow-up with:  PCP regarding preventative exams.      Chart initiated by      LIZETH/ MA            ALLERGIES/MEDICATIONS      Allergies:        Coded Allergies:             NO KNOWN ALLERGIES (Unverified , 9/27/19)      Medications    Last Reconciled on 9/27/19 16:26 by MALENA CEE MD      buPROPion HCl (Wellbutrin) 75 Mg Tablet      150 MG PO BID, #120 TAB 3 Refills         Prov: MALENA CEE         9/27/19        Fluticasone/Umeclidin/Vilanter (Trelegy Ellipta 100-62.5-25) 1 Each Blst.w.dev      1 PUFF INH RTQDAY, #1 INH 3 Refills         Prov: MALENA CEE         9/27/19       Montelukast Sodium (Singulair*) 10 Mg Tablet      10 MG PO HS, #30 TAB 0 Refills         Reported         8/7/19       Rosuvastatin Calcium (Crestor*) 10 Mg Tablet      10 MG PO HS, #30 TAB 0 Refills         Reported         8/7/19       traZODone HCl (traZODone HCl*) 150 Mg Tablet      150 MG PO HS, #30 TAB 0 Refills         Reported         8/7/19       Pantoprazole (Protonix*) 40 Mg Tablet.dr      40 MG PO HS, #30 TAB 0 Refills         Reported         8/7/19       Diltiazem 24HR ER (XR) (Diltiazem 24HR ER (XR)) 120 Mg Cap.er.deg      120 MG PO QDAY, CAP.ER         Reported         8/7/19       Aspirin EC (Aspirin EC) 81 Mg Tablet.dr      81 MG PO QDAY, #30 TAB.EC 0 Refills         Reported         8/7/19       Clopidogrel Bisulfate (Plavix) 75 Mg Tablet      75 MG PO QDAY, #30 TAB 0 Refills         Reported         8/7/19       MDI-Albuterol (Ventolin HFA) 18 Gm Hfa.aer.ad      2 PUFFS INH Q4H PRN for SHORTNESS OF BREATH, #1 INH 0 Refills         Reported         8/7/19      Current Medications      Current Medications Reviewed 9/27/19            EXAM      Vital Signs Reviewed      Gen: Thin and chronically ill appearing.  Alert, NAD.        HEENT:  PERRL, EOMI.  OP, nares clear, no sinus tenderness.      Neck:  Supple, no JVD, no thyromegaly.      Chest:  Good aeration, barrel chested, quiet breath sounds, tympanic to percussi    on bilaterally, no work of breathing noted.      CV:  RRR, no MGR, pulses 2+, equal.      Abd:  Soft, NT, ND, + BS, no HSM.      EXT:  No clubbing, no cyanosis, no edema, muscle wasting noted at all 4     extremities.       Neuro:  A  Skin: No rashes or lesions.      Vtials      Vitals:             Height 6 ft 1 in / 185.42 cm           Weight 130 lbs 2 oz / 59.712668 kg           BSA 1.79 m2           BMI 17.2 kg/m2            Temperature 98.1 F / 36.72 C - Oral           Pulse 103           Respirations 14           Blood Pressure 92/72 Sitting, Right Arm           Pulse Oximetry 91%, ROOMAIR            REVIEW      Results Reviewed      PCCS Results Reviewed?:  Yes Prev Lab Results, Yes Prev Radiology Results, Yes     Previous [facility] Records      Lab Results      I personally reviewed alpha 1 antitrypsin testing with normal genotype MM. I     personally reviewed the patient's 6 minute walk test showing no oxygen     desaturations with submaximal exertion.      Radiographic Results               [facility]                PACS RADIOLOGY REPORT            Patient: HERNESTO MCKEON   Acct: #[id number]   Report: #[id number]            UNIT #: [id number]    DOS: 19 1543      INSURANCE:[employer]   ORDER #:[id number]      LOCATION:[facility]     : 1950            PROVIDERS      ADMITTING:     ATTENDING: MALENA CEE      FAMILY:  GABRIELLA MONTERROSO   ORDERING:  MALENA CEE         OTHER:    DICTATING:  SUNNY ORTEGA MD            REQ #:[id number]   EXAM:WO - CT CHEST without CONTRAST      REASON FOR EXAM:  COPD,R05,J44.9,F17.201,E46,D86.9,R06.09      REASON FOR VISIT:  COPD,R05,J44.9,F17.201,E46,D86.9,R06.09            *******Signed******         PROCEDURE:   CT CHEST WITHOUT CONTRAST             COMPARISON:   None.             INDICATIONS:   COPD, SARCOIDOSIS, COUGH, DYSPNEA             TECHNIQUE:   CT images were created without the administration of contrast     material.               PROTOCOL:     Standard imaging protocol performed                RADIATION:     DLP: 333 mGy*cm          Automated exposure control was utilized to minimize radiation dose.              FINDINGS:         The visualized soft tissue structures at the base of the neck including the     thyroid appear within       normal limits.  There is no lower cervical or axillary adenopathy.  The  heart     size is normal.        There is no pericardial effusion.  There is coronary artery and aortic     atherosclerotic       calcification.  There is dilation of the ascending aortic arch measuring up to     4.2 cm in maximal       diameter near the main pulmonary artery bifurcation.  The main pulmonary artery     is normal in       caliber.  There are no pathologically enlarged mediastinal or hilar lymph nodes     by size criteria.        There are partially calcified lymph nodes likely related to prior granulomatous     disease.  The       esophagus is normal in course and caliber.             There is a small amount of debris within the central airways including the     trachea and bilateral       mainstem bronchi.  There is left lower lobe bronchial wall thickening and mucous    plugging with       associated tree-in-bud nodularity likely representing infectious or inflammatory    bronchiolitis       within a bronchial spread of infection.  Underlying chronic aspiration may be     present.  There are       moderate to advanced centrilobular emphysematous changes of the lungs.  There is    maldevelopment of       pleural parenchymal scarring.  There is curvilinear atelectasis within the     lateral aspect of the       right middle lobe.  There are small pleural based nodules including a 2 mm     nodule within the right       upper lobe posteriorly on image 20. There are no perilymphatic nodules.             Visualized portions of the upper abdomen demonstrate postcholecystectomy changes    with dilation of       the common bile duct up to 9 mm which is likely physiologic in the absence of an    elevated       bilirubin.  Partial visualization of cervical fusion.  There is a posterior     calcified disc at       T5-T6.             CONCLUSION:         1. Bronchial wall thickening and mucous plugging within the left lower lobe with    associated       tree-in-bud nodularity likely representing infectious or  inflammatory bronch    iolitis.  Aspiration       may be an underlying etiology.      2. Dilation of the ascending thoracic aorta up to 4.2 cm in maximal diameter.      3. Moderate to advanced centrilobular emphysema.      4. Incidental findings as detailed above.                SUNNY ORTEGA MD             Electronically Signed and Approved By: SUNNY ORTEGA MD on 2019 at 16:34                           Until signed, this is an unconfirmed preliminary report that may contain      errors and is subject to change.                                              BATB1:      D:19 1634      PFT Results      Patient: HERNESTO MCKEON   Acct: #[id number]   Report: #[id number]            UNIT #: [id number]    DOS:       LOCATION:[facility]     : 1950            PROVIDERS      ADMITTING:     FAMILY:  GABRIELLA MONTERROSO         OTHER:       DICTATING:  MALENA CEE MD            REASON FOR VISIT:  COPD,R05,J44.9,F17.201,E46,D86.9,R06.09            *******Signed******                                    [facility]                          [employer]                            [place], [place]  [address]               __________________________________________________________________________             Patient Name:                   Attending Physician:      Hernesto Mckeon M.D.             Patient Visit # MR #            Admit Date  Disch Date     Location      [id number]    [id number]      2019                 [facility]- -             Date of Birth      1950      __________________________________________________________________________      821 - DIAGNOSTIC REPORT             PULMONARY FUNCTION TEST             SPIROMETRY:        1. Moderate obstructive lung defect noted.        2. FEV1 2.53 L/69% of predicted.        3. No bronchodilator response noted.             FLOW VOLUME LOOP:      Flow volume loop suggests obstruction.              LUNG VOLUMES:      There is no evidence of hyperinflation or airtrapping present.             DIFFUSION CAPACITY:      Diffusion capacity is severely reduced at 29% predicted.             OVERALL IMPRESSION:        1.     Moderate airflow obstruction with no bronchodilator response.        2.     Airtrapping present.        3.     Diffusion capacity severely reduced.             To be electronically signed in [facility]      [id number] MALENA CEE M.D.             AM:      D:  08/24/2019 07:40      T:  08/24/2019 09:10      #[id number]             Until signed, this is an unconfirmed preliminary report that may contain      errors and is subject to change.                   Until signed, this is an unconfirmed preliminary report that may contain      errors and is subject to change.                     <Electronically signed by MALENA CEE MD>                08/24/19 1624               MALENA CEE MD                                                                  [facility]      D:08/24/19 0740            Assessment      Malnutrition         Moderate protein-calorie malnutrition - E44.0         Malnutrition type: protein-calorie malnutrition         Protein-calorie malnutrition severity: moderate            COPD (chronic obstructive pulmonary disease)         Centrilobular emphysema - J43.2         COPD type: emphysema         Emphysema type: centrilobular            Tobacco abuse - Z72.0            Notes      New Medications      * Fluticasone/Umeclidin/Vilanter (Trelegy Ellipta 100-62.5-25) 1 EACH       BLST.W.DEV: 1 PUFF INH RTQDAY #1      * buPROPion HCl (Wellbutrin) 75 MG TABLET: 150 MG PO BID #120      Discontinued Medications      * UMECLIDINIUM BROMIDE (Incruse Ellipta) 62.5 MCG BLST.W.DEV: 1 PUFF INH RTQDAY       #1         Dx: GRAVES (dyspnea on exertion) - R06.09      New Office Procedures      * Fluzone Vaccine High-Dose, Stat         Flu Vacc [id number](65Yr Up)/Pf (Fluzone High-Dose 2019-20  Syr) 180 MCG/0.5 ML       SYRINGE: 180 MICROGRAM INTRAMUSCULARLY Qty 1 SYRINGE         Dx: Malnutrition - E46      IMPRESSION:      1. Sarcoidosis. Unclear if this is truly his  diagnosis. It is remission and he     is off steroids.       2. Chronic dyspnea.       3. Chronic cough.       4.  Moderate chronic obstructive pulmonary disease.  Alpha 1 antitrypsin testing    unremarkable. FEV1 69% , would benefit from triple inhaler therapy. Chronic     obstructive pulmonary disease assessment test score is 30 today signifying poor     control of underlying disease.       5. Tobacco abuse of cigarettes.      6. Moderate protein calorie malnutrition with muscle wasting and BMI 17.2.            PLAN:      1. Stop current inhaler regimen and start trelegy 1 puff daily. Inhaler     education provided today.      2. No indication to further work up or treat sarcoidosis. It is unclear to me if    this was even his diagnosis.       3. Continue Singulair.       4. Smoking cessation counseling provided.  I spent 3 minutes today counseling     the patient on risks of smoking, including throat cancer, lung cancer, COPD,     heart disease and death. I also discussed the benefits of quitting.   I will     start him on Zyban.       5.  I spent 3 minutes discussing diet and exercise counseling. I recommend 30     minutes of daily exercise as well as 2500 calorie diet.   He refuses dietary     referral.       6. He is up to date with Prevnar and Pneumovax, I will give him Fluzone today.       7. Follow up with me in 4-6 months.            Patient Education      ACO BMI LOW BELOW 18.5:  Counseling given, Encouraged dietary supplements      Patient Education Provided:  COPD, How to use an Inhaler, Smoking Cessation            Patient Education:        Chronic Obstructive Pulmonary Disease                 Disclaimer: Converted document may not contain table formatting or lab diagrams. Please see [employer] for the  authenticated document.

## 2021-05-28 NOTE — PROGRESS NOTES
Patient: HERNESTO GREENE     Acct: [id number]     Report: #[id number]  UNIT #: [id number]     : 1950    Encounter Date:04/15/2020  PRIMARY CARE: GABRIELLA MONTERROSO  ***Signed***  --------------------------------------------------------------------------------------------------------------------  TELEHEALTH NOTE      History of Present Illness      Chief Complaint: soa follow up            Hernesto Greene is presenting for evaluation via Telehealth visit. Verbal consent     obtained before beginning visit.            Provider spent 22 minutes with the patient during telehealth visit including     discussing the case with the patient over the phone and personally reviewing all    pertinent labs, imaging and provider notes.              The following staff were present during the visit: thierry zimmer MD and jack lott ma            The patient is a 69 year old  male with chronic obstructive pulmonary     disease  who presents for Telehealth visit today. Since his last office visit he    has again been put on steroids for chronic obstructive pulmonary disease     exacerbation and now has leg swelling and thrush. His wife is very upset and     wants him off steroids. His primary care provider and urgent care keep putting     him on steroids. There is no evidence of sarcoidosis and he has no indication to    be on steroids. He is on Daliresp and trelegy ellipta inhaler and is having     significant issues breathing. His last CT scan of the chest in the beginning of     March showed left lower lobe mucous plugging but otherwise clear lung fields     with severe emphysema. He gets short of breath walking about 200 feet and can     barely go up a flight of steps. It is severe in severity, worse with exertion,     relieved with rest with no associated chest pain or hemoptysis. He does have     cough and feels like mucous is stuck in his airways and is wheezing. He feels     his nebulizer are doing better than  his inhaler. He is complaining of blisters     in his mouth. He is able to perform his ADL's without difficulty and denies any     swollen glands in his lymph nodes, head or neck.  He is smoking 1 pack per day.             I personally reviewed Review of Systems, family, social, surgical and medical     history and agree with their findings.              Physical exam is deferred due to Telehealth visit.            I personally reviewed alpha 1 antitrypsin testing with normal genotype MM.                          Past Med History               [facility]                PACS RADIOLOGY REPORT            Patient: HERNESTO MCKEON   Acct: #[id number]   Report: #[id number]            UNIT #: [id number]    DOS: 20 1215      INSURANCE:[employer]   ORDER #:[id number]      LOCATION:[facility]     : 1950            PROVIDERS      ADMITTING:     ATTENDING: MALENA CEE      FAMILY:  GABRIELLA MONTERROSO   ORDERING:  MALENA CEE         OTHER:    DICTATING:  Kiko Jackson MD            REQ #:[id number]   EXAM:WO - CT CHEST without CONTRAST      REASON FOR EXAM:        REASON FOR VISIT:  COPD            *******Signed******         PROCEDURE:   CT CHEST WITHOUT CONTRAST             COMPARISON:   [facility], CT, CHEST W/O CONTRAST, 2019,     15:43.             INDICATIONS:   COPD, SARCOIDOSIS, FOLLOW UP.             TECHNIQUE:   CT images were created without the administration of contrast     material.               PROTOCOL:     Standard imaging protocol performed                RADIATION:     DLP: 214.7mGy*cm          Automated exposure control was utilized to minimize radiation dose.              FINDINGS:         Again seen are multiple partially calcified mediastinal and hilar lymph nodes     consistent with       changes of prior granulomatous disease.  There is a borderline sized precarinal     lymph node which is       stable.   There is ectasia of the ascending thoracic aorta measuring 4.2 cm in     size stable from       prior exam.  There is moderate calcified plaque of the thoracic aorta and     coronary arteries.        Descending thoracic aorta is of normal caliber.  There are no pleural effusions.     Again seen is       mucous plugging of multiple left lower lobe bronchi but without significant     atelectasis.  There are       multiple 1-2 mm sized ground-glass interstitial nodules within the left lower     lobe which appear       improved.  No focal airspace consolidation identified.  There is a calcified     granuloma within the       lateral aspect of the right upper lobe.  No suspicious other noncalcified     pulmonary nodule       identified.  Bilateral adrenal glands are within normal limits.  Patient is     status post       cholecystectomy.  Other visualized portions of the upper abdomen are     unremarkable.  There       degenerative changes of the spine.  Patient is status post anterior and     posterior fusion of the       lower cervical spine which is incompletely imaged.  No lytic or sclerotic bony     lesion identified.             CONCLUSION:         1. Mucous plugging of multiple left lower lobe bronchi unchanged from prior     exam.  There is no       resultant atelectasis.  The there are multiple tiny 1-2 mm sized ground-glass     interstitial nodules       in tree-in-bud distribution which appear decreased in size quantity when     compared to the prior       exam.  No focal airspace consolidation.              REBEKAH RIVAS MD             Electronically Signed and Approved By: REBEKAH RIVAS MD on 3/11/2020 at 13:34                           Until signed, this is an unconfirmed preliminary report that may contain      errors and is subject to change.                                              STEBA:      D:03/11/20 1334            Plan/Instructions      Ambulatory Assessment/Plan:        Notes      New  Medications      * NEB-Albuterol Sulf (Albuterol Sulfate) 5 MG/1 ML SOLUTION: 2.5 MG INH RTTID #2         Instructions: DIAGNOSIS CODE REQUIRED PRIOR TO PRESCRIBING.      * Fluconazole 100 MG TABLET: 100 MG PO QDAY #21      * REVEFENACIN (YUPELRI) 175 MCG/3 ML NEBU          Sample - Qty 4      * Formoterol Fumarate (Perforomist) 20 MCG/2 ML VIAL.NEB          Sample - Qty 7      * REVEFENACIN (YUPELRI) 175 MCG/3 ML NEBU: 175 MCG INH RTQDAY 30 Days #30         Instructions: DX: J44.9, J41.1, J43.9 NPI: [id number]      * Formoterol Fumarate (Perforomist) 20 MCG/2 ML VIAL.NEB: 20 MCG INH BID #60         Instructions: DX: J44.9, J41.1, J43.9 NPI: [id number]      * Neb-Budesonide (Budesonide) 0.5 MG/2 ML AMPUL.NEB: 0.5 MG INH RTBID #60         Instructions: DX: J44.9, J41.1, J43.9 NPI: [id number]      Discontinued Medications      * Fluticasone/Umeclidin/Vilanter (Trelegy Ellipta 100-62.5-25) 1 EACH       BLST.W.DEV: 1 PUFF INH RTQDAY #1      Plan/Instructions      * Plan Of Care: ()            * Chronic conditions reviewed and taken into consideration for today's treatment       plan.      * Patient instructed to seek medical attention urgently for new or worsening       symptoms.      * Patient was educated/instructed on their diagnosis, treatment and medications       prior to discharge from the clinic today.            IMPRESSION:      1. Oropharyngeal thrush.       2. Moderate chronic obstructive pulmonary disease without exacerbation. FEV1     69%, significant issues with mucous plugging and still on triple inhaler     therapy. Chronic obstructive pulmonary disease assessment test score is 26 today     signifying poor control of underlying disease on current therapies.       3. Ongoing tobacco abuse of cigarettes.       4. Acute on chronic dyspnea.       5. Acute on chronic cough.       6. Mucous plugging/issues with airway clearance.       7. Moderate protein calorie malnutrition and muscle wasting historically.              PLAN:      1. Discontinue trelegy ellipta inhaler and start Yupelri, Brovana and Pulmicort     nebulizer. Nebulizer education provided today over the phone. Continue albuterol     as needed.       2. Give 21 days of fluconazole. Discontinue steroids. Do not treat sarcoidosis.     Based off what I am seeing on his imaging, he does not have sarcoidosis.       3. I encouraged ambulation and continue nebulizer which will improve his mucous     plugging. If still no improvement by next visit we will add flutter valve and 3%     saline nebulizer.       4. Smoking cessation counseling provided.  I spent 5 minutes today counseling     the patient on risks of smoking, including throat cancer, lung cancer, COPD,     heart disease and death. I also discussed the benefits of quitting.  The patient     decline nicotine replacement therapy or  pharmacotherapy. He tried Zyban and it     is not helping.       5. Up to date with  flu, Prevnar and Pneumovax.   .       6. Follow up with us in 4 weeks.      Codes:  Phone Eval 11-20 mi [id number]            Electronically signed by MALENA CEE  05/06/2020 14:19       Disclaimer: Converted document may not contain table formatting or lab diagrams. Please see [employer] for the authenticated document.

## 2021-05-28 NOTE — PROGRESS NOTES
Patient: HERNESTO MCKEON     Acct: [id number]     Report: #[id number]  UNIT #: [id number]     : 1950    Encounter Date:2020  PRIMARY CARE: DANIA GROSSMAN  ***Signed***  --------------------------------------------------------------------------------------------------------------------  Chief Complaint      Encounter Date      2020            Primary Care Provider            dania grossman            Referring Provider            dania grossman            Patient Complaint      Patient is complaining of      Patient here today for F/U            VITALS      Height 73 in / 185.42 cm      Weight 131 lbs  / 59.087141 kg      BSA 1.80 m2      BMI 17.3 kg/m2      Temperature 98.2 F / 36.78 C - Temporal      Pulse 74      Respirations 15      Blood Pressure 104/74 Sitting, Right Arm      Pulse Oximetry 95%, room air      Initial Exhaled Nitrous Oxide      Date:  Aug 7, 2019      Exhaled Nitrous Oxide Results:  0            HPI      The patient is a very malnourished 70 year old  male cigarette smoker     with COPD here for follow up.  Since stopping chronic steroids, he has been     losing weight.  Also, he has lost more weight over the last 1-2 weeks, also     reports 1-2 weeks of increasing fatigue.  He also reports increasing shortness     of breath, cough.  He is not coughing up thick yellow sputum over the last 1-2     weeks. He has not received any antibiotics or steroids. His wife also notes     constipation.  He did have a normal colonoscopy last year, but states that he     could not have any more bowel movements and despite what he is taking he feels     miserable. He denies any other nausea, vomiting, headaches, chest pain or     hemoptysis.  He denies any fevers or chills. He is able to perform his ADLs     without difficulty.  Denies any swollen glands or lymph nodes of the head and     neck.  He is still smoking and is smoking about one pack per day.            I have personally  reviewed the review of systems, past family, social, surgical     and medical histories and I agree with the findings.            ROS      Constitutional:  Denies: Fatigue, Fever, Weight gain, Weight loss, Chills,     Insomnia, Other      Respiratory/Breathing:  Complains of: Shortness of air, Cough; Denies: Wheezing,    Hemoptysis, Pleuritic pain, Other      Endocrine:  Denies: Polydipsia, Polyuria, Heat/cold intolerance, Diabetes, Other      Eyes:  Denies: Blurred vision, Vision Changes, Other      Ears, nose, mouth, throat:  Denies: Congestion, Dysphagia, Hearing Changes, Nose    Bleeding, Nasal Discharge, Throat pain, Tinnitus, Other      Cardiovascular:  Denies: Chest Pain, Exertional dyspnea, Peripheral Edema,     Palpitations, Syncope, Wake up Gasping for air, Orthopnea, Tachycardia, Other      Gastrointestinal:  Denies: Abdominal pain/cramping, Bloody stools, Constipation,    Diarrhea, Melena, Nausea, Vomiting, Other      Genitourinary:  Denies: Dysuria, Urinary frequency, Incontinence, Hematuria,     Urgency, Other      Musculoskeletal:  Denies: Joint Pain, Joint Stiffness, Joint Swelling, Myalgias,    Other      Hematologic/lymphatic:  DENIES: Lymphadenopathy, Bruising, Bleeding tendencies,     Other      Neurologic:  Denies: Headache, Numbness, Weakness, Seizures, Other      Psychiatric:  Denies: Anxiety, Appropriate Effect, Depression, Other      Sleep:  No: Excessive daytime sleep, Morning Headache?, Snoring, Insomnia?, Stop    breathing at sleep?, Other      Integumentary:  Denies: Rash, Dry skin, Skin Warm to Touch, Other            FAMILY/SOCIAL/MEDICAL HX      Surgical History:  Yes: CABG (aorta repair), Orthopedic Surgery (/ left arm cru    shed/ johny in right leg), Throat Surgery (right side vocal chords paralysis)      Heart - Family Hx:  Brother      Diabetes - Family Hx:  Brother      Cancer/Type - Family Hx:  Mother, Father, Brother, Sister      Is Father Still Living?:  No      Is Mother Still  Living?:  No       Family History:  Yes      Social History:  Tobacco Use; No Alcohol Use, No Recreational Drug use      Smoking status:  Current every day smoker (1 ppd x 55y)      Anticoagulation Therapy:  No      Antibiotic Prophylaxis:  No      Medical History:  Yes: Stroke (2018), Miscellaneous Medical/oth (sarcoidosis); N    o: Sinus Trouble      Psychiatric History      none            PREVENTION      Hx Influenza Vaccination:  Yes      Date Influenza Vaccine Given:  Nov 1, 2019      2 or More Falls Past Year?:  No      Fall Past Year with Injury?:  No      Hx Pneumococcal Vaccination:  Yes      Encouraged to follow-up with:  PCP regarding preventative exams.      Chart initiated by      Piage Sabillon CMA            ALLERGIES/MEDICATIONS      Allergies:        Coded Allergies:             NO KNOWN ALLERGIES (Unverified , 5/14/20)      Medications    Last Reconciled on 6/30/20 16:47 by MALENA CEE MD      predniSONE (Deltasone) 10 Mg Tablet      10 MG PO ASDIR, #45 TAB 0 Refills         Prov: MALENA CEE         6/30/20       Amoxicillin/Clavulanic Acid 875/125 (Augmentin 875/125) 1 Each Tablet      875 MG PO BID, #14 TAB 0 Refills         Prov: MALENA CEE         6/30/20       MDI-Albuterol (Proair HFA) 8.5 Gm Hfa.aer.ad      2 PUFFS INH Q4-6H PRN for SHORTNESS OF BREATH, #1 MDI 5 Refills         Reported         5/14/20       Neb-NaCl 3% (Sodium Chloride 3% Neb) 4 Ml Vial.neb      4 ML INH RTBID for 30 Days, #60 NEB 5 Refills         Prov: Nini Pino PA-C         5/14/20       Neb-Budesonide (Budesonide) 0.5 Mg/2 Ml Ampul.neb      0.5 MG INH RTBID, #60 NEB 11 Refills         Prov: MALENA CEE         5/5/20       Formoterol Fumarate (Perforomist) 20 Mcg/2 Ml Vial.neb      20 MCG INH BID, #60 NEB 11 Refills         Prov: MALENA CEE         5/5/20       Revefenacin (YUPELRI) 175 Mcg/3 Ml Nebu      175 MCG INH RTQDAY for 30 Days, #30 NEB 11 Refills         Prov: MALENA CEE         5/5/20        NEB-Albuterol Sulf (Albuterol Sulfate) 5 Mg/1 Ml Solution      2.5 MG INH RTTID, #2 BOTTLE 0 Refills         Reported         4/15/20       buPROPion HCl (Wellbutrin) 75 Mg Tablet      150 MG PO BID, #120 TAB 3 Refills         Prov: MALENA CEE         9/27/19       Montelukast Sodium (Singulair*) 10 Mg Tablet      10 MG PO HS, #30 TAB 0 Refills         Reported         8/7/19       Rosuvastatin Calcium (Crestor*) 10 Mg Tablet      10 MG PO HS, #30 TAB 0 Refills         Reported         8/7/19       traZODone HCl (traZODone HCl) 150 Mg Tablet      150 MG PO HS, #30 TAB 0 Refills         Reported         8/7/19       Pantoprazole (Protonix) 40 Mg Tablet.dr      40 MG PO HS, #30 TAB 0 Refills         Reported         8/7/19       dilTIAZem 24Hr ER (dilTIAZem 24Hr ER) 120 Mg Cap.er.deg      120 MG PO QDAY, CAP.ER         Reported         8/7/19       Aspirin EC (Aspirin EC) 81 Mg Tablet.dr      81 MG PO QDAY, #30 TAB.EC 0 Refills         Reported         8/7/19       Clopidogrel Bisulfate (Plavix) 75 Mg Tablet      75 MG PO QDAY, #30 TAB 0 Refills         Reported         8/7/19      Current Medications      Current Medications Reviewed 6/30/20            EXAM      Vital Signs Reviewed.      General:  Thin and chronically ill appearing, alert, in no apparent distress.        HEENT: PERRL, EOMI.  OP, nares clear, no sinus tenderness.      Neck: Supple, no JVD, no thyromegaly.      Lymph: No axillary, cervical, supraclavicular lymphadenopathy noted bilaterally.      Chest: Barrel chested, poor aeration, coarse crackles and rhonchi throughout all    lung fields, tympanic to percussion bilaterally, no work of breathing noted.        CV: RRR, no MGR, pulses 2+, equal.        Abd: Soft, NT, ND, +BS, no HSM.      EXT: No clubbing, no cyanosis, no edema, no joint tenderness.        Neuro:  A  Skin: No rashes or lesions.      Vitals      Vitals:             Height 73 in / 185.42 cm           Weight 131 lbs  / 59.396949  kg           BSA 1.80 m2           BMI 17.3 kg/m2           Temperature 98.2 F / 36.78 C - Temporal           Pulse 74           Respirations 15           Blood Pressure 104/74 Sitting, Right Arm           Pulse Oximetry 95%, room air            REVIEW      Results Reviewed      PCCS Results Reviewed?:  Yes Prev Lab Results, Yes Prev Radiology Results, Yes     Previous [facility] Records      Lab Results      I reviewed my last office note as well as Jean Clark last office note.            Assessment      Pneumonia         Pneumonia due to infectious organism, unspecified laterality, unspecified        part of lung - J18.9         Pneumonia type: due to unspecified organism         Laterality: unspecified laterality         Lung location: unspecified part of lung            COPD exacerbation - J44.1            Weight loss - R63.4            Constipation         Constipation, unspecified constipation type - K59.00         Constipation type: unspecified constipation type            Notes      New Medications      * Amoxicillin/Clavulanic Acid 875/125 (Augmentin 875/125) 1 EACH TABLET: 875 MG       PO BID #14         Dx: Pneumonia - J18.9      * predniSONE (Deltasone) 10 MG TABLET: 10 MG PO ASDIR #45         Instructions: 92beo6l,06szu8a,82yie6t,73vxr7k,81alp0r         Dx: Pneumonia - J18.9      Discontinued Medications      * ROFLUMILAST (Daliresp) 500 MCG TAB: 500 MCG PO QDAY 30 Days #30      New Diagnostics      * Chest 2 View, As Soon As Possible         Dx: Pneumonia - J18.9      * Sputum Culture W/Gram Stain, Week         Dx: Pneumonia - J18.9      * Abd/Pel W/ Cont CT, As Soon As Possible         Dx: Pneumonia - J18.9      * Chest W/O Cont CT, As Soon As Possible         Dx: Pneumonia - J18.9      IMPRESSION:      1.  Question of community acquired pneumonia from unspecified organism.      2.  Acute exacerbation of COPD.      3.  Unintentional weight loss.      4. Worsening acute fatigue, likely infectious  related.      5.  Acute on chronic dyspnea.      6.   Recurrent chronic cough.      7.  Moderate COPD.      8.  Chronic bronchitis phenotype.  FEV1 of 69%.  Still with frequent     exacerbations and is currently on Daliresp.      9. Tobacco abuse with cigarettes.      10. Moderate protein calorie malnutrition with muscle wasting, BMI 17.3.              PLAN:      1.  Continue current nebulizer and inhaler regimen as well as Daliresp.      2. We will give 7 days of Augmentin and two week steroid taper.      3. Check chest x-ray now.      4. We will check CT of chest, abdomen and pelvis given the constipation and     weight loss to evaluate for malignant etiology.      5. Continue aggressive airway clearance.      6.  Check sputum culture and two view chest x-ray now.      7. We will have patient follow up next week with one of our advance care     practitioners. If there is no clinical improvement, I will have him set up for     bronchoscopy with me the following week if they will arrange this.      8. Encouraged 3000 calorie diet and 30 minutes of daily exercise.  I spent three    minutes counseling the patient regarding his malnutrition.      9.  Smoking cessation counseling provided.  I spent three minutes today     counseling the patient on risks of smoking, including throat cancer, lung     cancer, COPD, heart disease and death. I also discussed the benefits of     quitting.  He declines nicotine replacement therapy or pharmacotherapy.      10.  Up-to-date with flu, Prevnar and Pneumovax.      11. Follow up with me in one month.            Patient Education      ACO BMI LOW BELOW 18.5:  Counseling given, Encouraged dietary supplements      Tobacco Cessation Counseling:  for 3 - 10 minutes      Patient Education Provided:  Acute Respiratory Syndrom, COPD, Smoking Cessation            Electronically signed by MALENA CEE  07/01/2020 16:31       Disclaimer: Converted document may not contain table formatting or  lab diagrams. Please see [employer] [facility] for the authenticated document.

## 2021-05-28 NOTE — PROGRESS NOTES
Patient: HERNESTO MCKEON     Acct: [id number]     Report: #[id number]  UNIT #: [id number]     : 1950    Encounter Date:2020  PRIMARY CARE: DANIA GROSSMAN  ***Signed***  --------------------------------------------------------------------------------------------------------------------  Chief Complaint      Encounter Date      Aug 28, 2020            Primary Care Provider            dania grossman            Referring Provider            dania grossman            Patient Complaint      Patient is complaining of      f/u, COPD            VITALS      Height 6 ft 1 in / 185.42 cm      Weight 132 lbs  / 59.750947 kg      BSA 1.80 m2      BMI 17.4 kg/m2      Temperature 98.3 F / 36.83 C - Tympanic      Pulse 67      Respirations 16      Blood Pressure 109/59 Sitting, Right Arm      Pulse Oximetry 95%, room air      Initial Exhaled Nitrous Oxide      Date:  Aug 7, 2019      Exhaled Nitrous Oxide Results:  0            HPI      The patient is a very pleasant 70 year old  male with profound     malnourishment, COPD and active cigarette smoking here for follow up. He has don    e well. Since last office visit, CT of chest, abdomen and pelvis shows     resolution of pneumonia with no active cancer and no other reasons for weight     loss.  His weight is the same since I last saw him about 2-3 months ago. He is     maximized on nebulizers, airway clearance and Daliresp. He gets short of breath     very easily. He gets short of breath walking  feet, severe in severity,     worse with exertion and relieved with rest. His cough throughout the day is     worse after breathing treatments, productive of thick, clear sputum.  He denies     any wheezing, chest pain or hemoptysis.  Denies any nausea, vomiting, fevers,     chills, headaches or chest pain. He is still smoking one pack of cigarettes a     day. He again he refused pulmonary rehab.  He is able to perform ADLs without     difficulty.  Denies any swollen  glands or lymph nodes of the head and neck.            I have personally reviewed the review of systems, past family, social, surgical     and medical histories and I agree with the findings.            ROS      Constitutional:  Complains of: Fatigue; Denies: Fever, Weight gain, Weight loss,    Chills, Insomnia, Other      Respiratory/Breathing:  Complains of: Shortness of air; Denies: Wheezing, Cough,    Hemoptysis, Pleuritic pain, Other      Endocrine:  Denies: Polydipsia, Polyuria, Heat/cold intolerance, Diabetes, Other      Eyes:  Denies: Blurred vision, Vision Changes, Other      Ears, nose, mouth, throat:  Denies: Congestion, Dysphagia, Hearing Changes, Nose    Bleeding, Nasal Discharge, Throat pain, Tinnitus, Other      Cardiovascular:  Denies: Chest Pain, Exertional dyspnea, Peripheral Edema,     Palpitations, Syncope, Wake up Gasping for air, Orthopnea, Tachycardia, Other      Gastrointestinal:  Denies: Abdominal pain/cramping, Bloody stools, Constipation,    Diarrhea, Melena, Nausea, Vomiting, Other      Genitourinary:  Denies: Dysuria, Urinary frequency, Incontinence, Hematuria,     Urgency, Other      Musculoskeletal:  Denies: Joint Pain, Joint Stiffness, Joint Swelling, Myalgias,    Other      Hematologic/lymphatic:  DENIES: Lymphadenopathy, Bruising, Bleeding tendencies,     Other      Neurologic:  Denies: Headache, Numbness, Weakness, Seizures, Other      Psychiatric:  Denies: Anxiety, Appropriate Effect, Depression, Other      Sleep:  No: Excessive daytime sleep, Morning Headache?, Snoring, Insomnia?, Stop    breathing at sleep?, Other      Integumentary:  Denies: Rash, Dry skin, Skin Warm to Touch, Other            FAMILY/SOCIAL/MEDICAL HX      Surgical History:  Yes: CABG (aorta repair), Orthopedic Surgery (/ left arm     crushed/ johny in right leg), Throat Surgery (right side vocal chords paralysis)      Heart - Family Hx:  Brother      Diabetes - Family Hx:  Brother      Cancer/Type - Family Hx:   Mother, Father, Brother, Sister      Is Father Still Living?:  No      Is Mother Still Living?:  No       Family History:  Yes      Social History:  Tobacco Use; No Alcohol Use, No Recreational Drug use      Smoking status:  Current every day smoker (1 ppd x 55y)      Anticoagulation Therapy:  No      Antibiotic Prophylaxis:  No      Medical History:  Yes: Stroke (2018), Miscellaneous Medical/oth (sarcoidosis);     No: Sinus Trouble      Psychiatric History      none            PREVENTION      Hx Influenza Vaccination:  Yes      Date Influenza Vaccine Given:  Nov 1, 2019      2 or More Falls in Past Year?:  No      Fall Past Year with Injury?:  No      Hx Pneumococcal Vaccination:  Yes      Encouraged to follow-up with:  PCP regarding preventative exams.      Chart initiated by      Wendy Ortega CMA            ALLERGIES/MEDICATIONS      Allergies:        Coded Allergies:             NO KNOWN ALLERGIES (Unverified , 8/28/20)      Medications    Last Reconciled on 8/28/20 13:55 by MALENA CEE MD      Roflumilast (Daliresp) 500 Mcg Tab      500 MCG PO QDAY for 30 Days, #30 TAB 3 Refills         Prov: [facility]         7/9/20       buPROPion HCl (Wellbutrin) 75 Mg Tablet      150 MG PO BID, #120 TAB 3 Refills         Prov: JIMBO SEAY [facility]         7/9/20       MDI-Albuterol (Proair HFA) 8.5 Gm Hfa.aer.ad      2 PUFFS INH Q4-6H PRN for SHORTNESS OF BREATH, #1 MDI 5 Refills         Reported         5/14/20       Neb-NaCl 3% (Sodium Chloride 3% Neb) 4 Ml Vial.neb      4 ML INH RTBID for 30 Days, #60 NEB 5 Refills         Prov: Nini Pino PA-C         5/14/20       Neb-Budesonide (Budesonide) 0.5 Mg/2 Ml Ampul.neb      0.5 MG INH RTBID, #60 NEB 11 Refills         Prov: MALENA CEE         5/5/20       Formoterol Fumarate (Perforomist) 20 Mcg/2 Ml Vial.neb      20 MCG INH BID, #60 NEB 11 Refills         Prov: MALENA CEE         5/5/20       Revefenacin (YUPELRI) 175 Mcg/3 Ml Nebu      175 MCG  INH RTQDAY for 30 Days, #30 NEB 11 Refills         Prov: MALENA CEE         5/5/20       NEB-Albuterol Sulf (Albuterol Sulfate) 5 Mg/1 Ml Solution      2.5 MG INH RTTID, #2 BOTTLE 0 Refills         Reported         4/15/20       Montelukast Sodium (Singulair*) 10 Mg Tablet      10 MG PO HS, #30 TAB 0 Refills         Reported         8/7/19       Rosuvastatin Calcium (Crestor*) 10 Mg Tablet      10 MG PO HS, #30 TAB 0 Refills         Reported         8/7/19       traZODone HCl (traZODone HCl) 150 Mg Tablet      150 MG PO HS, #30 TAB 0 Refills         Reported         8/7/19       Pantoprazole (Protonix) 40 Mg Tablet.dr      40 MG PO HS, #30 TAB 0 Refills         Reported         8/7/19       dilTIAZem 24Hr ER (dilTIAZem 24Hr ER) 120 Mg Cap.er.deg      120 MG PO QDAY, CAP.ER         Reported         8/7/19       Aspirin EC (Aspirin EC) 81 Mg Tablet.dr      81 MG PO QDAY, #30 TAB.EC 0 Refills         Reported         8/7/19       Clopidogrel Bisulfate (Plavix) 75 Mg Tablet      75 MG PO QDAY, #30 TAB 0 Refills         Reported         8/7/19      Current Medications      Current Medications Reviewed 8/28/20            EXAM      Vital Signs Reviewed.      General:  Thin nad chronically ill appearing, Alert, NAD.      HEENT: PERRL, EOMI.  OP, nares clear, no sinus tenderness.      Neck: Supple, no JVD, no thyromegaly.      Chest: Barrel chested, poor aeration, trace bibasilar rhonchi, tympanic to     percussion bilaterally, no work of breathing noted.      CV: RRR, no MGR, pulses 2+, equal.        Abd: Soft, NT, ND, +BS, no HSM.      EXT: No clubbing, no cyanosis, no edema, no joint tenderness, muscle wasting not    ed all four extremities.        Neuro:  A  Skin: No rashes or lesions.      Vitals      Vitals:             Height 6 ft 1 in / 185.42 cm           Weight 132 lbs  / 59.457185 kg           BSA 1.80 m2           BMI 17.4 kg/m2           Temperature 98.3 F / 36.83 C - Tympanic           Pulse 67            Respirations 16           Blood Pressure 109/59 Sitting, Right Arm           Pulse Oximetry 95%, room air            REVIEW      Results Reviewed      P[facility]S Results Reviewed?:  Yes Prev Lab Results, Yes Prev Radiology Results, Yes     Previous [facility] Records      Lab Results      i personally reviewed my last office visit note as well as LEOPOLDO Soto's last office note. I personally reviewed a CT of the chest, abdomen and     pelvis done in 08/2020.  I also personally reviewed a renal panel showing no     evidence of chronic hypercapnic respiratory failure. Sputum culture from 07/2020    grew pseudomonas and streptococcal pneumonia.            Assessment      Notes      Discontinued Medications      * predniSONE (Deltasone) 10 MG TABLET: 10 MG PO ASDIR #45         Instructions: [id number],[id number],[id number]         Dx: Pneumonia - J18.9      * levoFLOXacin 750 MG TABLET: 750 MG PO QDAY 14 Days #14      * ROFLUMILAST (Daliresp) 250 MCG TABLET          Sample - Qty 1      * Formoterol Fumarate (Perforomist) 20 MCG/2 ML VIAL.NEB          Sample - Qty 4      * REVEFENACIN (YUPELRI) 175 MCG/3 ML NEBU          Sample - Qty 4      IMPRESSION:      1. Pseudomonas pneumonia and streptococcal pneumonia, resolved.      2.  COPD without exacerbation.  Gold stage E COPD on Daliresp and triple     nebulizer therapy. COPD assessment test score is 28 today.  This is poor     controlled. He would benefit from pulmonary rehab, unfortunately he refuses.      3. Mucus plugging/issues with airway clearance.      4. Chronic dyspnea.      5. Chronic cough.      6. Severe protein calorie malnutrition with muscle wasting.      7. Tobacco abuse with cigarettes, ongoing.               PLAN:      1.  Continue Yupelri, Perforomist, Pulmicort and Daliresp with albuterol as     needed.        2. Continue flutter valve with 3% saline nebs.      3. I offered pulmonary rehab, but he refused.      4. I spent three minutes doing  diet and exercise counseling.  Recommended 3000     calorie a day diet and activity.  I offered him dietary referral, but he     declined.      5. Chest CT shows resolution of pneumonia. CT of the abdomen and pelvis shows no    obvious cause of weight loss.  This is likely secondary to how catabolic he is     due to his severe COPD.      6.  Up-to-date with flu, Prevnar and Pneumovax.      7. I offered pulmonary rehab, but he declined.      8. Follow up in six months.            Patient Education      ACO BMI LOW BELOW 18.5:  Counseling given, Encouraged dietary supplements      Tobacco Cessation Counseling:  for 3 - 10 minutes      Patient Education Provided:  COPD, Smoking Cessation            Electronically signed by MALENA CEE  08/31/2020 09:43       Disclaimer: Converted document may not contain table formatting or lab diagrams. Please see [employer] for the authenticated document.

## 2021-05-28 NOTE — PROGRESS NOTES
Patient: HERNESTO MCKEON     Acct: [id number]     Report: #[id number]  UNIT #: [id number]     : 1950    Encounter Date:2019  PRIMARY CARE: DANIA GROSSMAN  ***Signed***  --------------------------------------------------------------------------------------------------------------------  Chief Complaint      Encounter Date      Aug 7, 2019            Primary Care Provider            dania grossman            Referring Provider            dania grossman            Patient Complaint      Patient is complaining of      sarcoidosis            VITALS      Height 6 ft 1 in / 185.42 cm      Weight 136 lbs 9 oz / 61.942600 kg      BSA 1.83 m2      BMI 18.0 kg/m2      Temperature 98.2 F / 36.78 C - Oral      Pulse 91      Respirations 14      Blood Pressure 141/67 Sitting, Left Arm      Pulse Oximetry 96%, roomair      Initial Exhaled Nitrous Oxide      Date:  Aug 7, 2019      Exhaled Nitrous Oxide Results:  0            HPI      The patient is a very pleasant 69 year old  male cigarette smoker with     a reported history of a diagnosis about one year ago of sarcoidosis at [facility] done on a biopsy. He is here for evaluation.  He tells me that    about one year ago he had a biopsy done and they told him that he may have had     cancer. Then afterwards they told him he may have sarcoidosis. He was sent to a     pulmonologist up in [place] who repeated the x-ray after the patient was on     high steroids and noted complete resolution of infiltrates. This patient has not    seen a pulmonologist in over a year. He has been on prednisone 20 mg daily for     over the past year.  He does take Breo 200 with albuterol as needed. He gets     short of breath with walking about 100-200 feet, mild to moderate in severity,     worse with exertion and relieved with rest.  He denies any coughing, wheezing,     headaches, chest pain or hemoptysis.  He denies any erythema nodosum, double     vision and  blurred vision. He has no imaging in our system and no records     regarding the sarcoidosis diagnosis.  He is on Singulair and denies any recent     itchy-scratchy throat, watery eyes or nasal congestion. He has smoked one pack     of cigarettes a day and has failed Chantix. He has also been on nicotine patch     and is willing to try that again.  He is able to perform ADLs without     difficulty.  Denies any swollen glands or lymph nodes of the head and neck.  He     has lost some weight over the past year despite being on prednisone and his BMI     is currently 18.0.            I have personally reviewed the review of systems, past family, social, surgical     and medical histories and I agree with the findings.            ROS      Constitutional:  Denies: Fatigue, Fever, Weight gain, Weight loss, Chills,     Insomnia, Other      Respiratory/Breathing:  Complains of: Shortness of air, Wheezing, Cough; Denies:    Hemoptysis, Pleuritic pain, Other      Endocrine:  Denies: Polydipsia, Polyuria, Heat/cold intolerance, Diabetes, Other      Eyes:  Denies: Blurred vision, Vision Changes, Other      Ears, nose, mouth, throat:  Denies: Mouth lesions, Thrush, Throat pain,     Hoarseness, Allergies/Hay Fever, Post Nasal Drip, Headaches, Recent Head Injury,    Nose Bleeding, Neck Stiffness, Thyroid Mass, Hearing Loss, Ear Fullness, Dry     Mouth, Nasal or Sinus Pain, Dry Lips, Nasal discharge, Nasal congestion, Other      Cardiovascular:  Denies: Palpitations, Syncope, Claudication, Chest Pain, Wake     up Gasping for air, Leg Swelling, Irregular Heart Rate, Cyanosis, Dyspnea on     Exertion, Other      Gastrointestinal:  Denies: Nausea, Constipation, Diarrhea, Abdominal pain,     Vomiting, Difficulty Swallowing, Reflux/Heartburn, Dysphagia, Jaundice,     Bloating, Melena, Bloody stools, Other      Genitourinary:  Denies: Urinary frequency, Incontinence, Hematuria, Urgency,     Nocturia, Dysuria, Testicular problems, Other       Musculoskeletal:  Denies: Joint Pain, Joint Stiffness, Joint Swelling, Myalgias,    Other      Hematologic/lymphatic:  DENIES: Lymphadenopathy, Bruising, Bleeding tendencies,     Other      Neurological:  Denies: Headache, Numbness, Weakness, Seizures, Other      Psychiatric:  Denies: Anxiety, Appropriate Effect, Depression, Other      Sleep:  No: Excessive daytime sleep, Morning Headache?, Snoring, Insomnia?, Stop    breathing at sleep?, Other      Integumentary:  Denies: Rash, Dry skin, Skin Warm to Touch, Other      Immunologic/Allergic:  Denies: Latex allergy, Seasonal allergies, Asthma,     Urticaria, Eczema, Other      Immunization status:  No: Up to date            FAMILY/SOCIAL/MEDICAL HX      Surgical History:  Yes: CABG (aorta repair), Orthopedic Surgery (/ left arm     crushed/ johny in right leg), Throat Surgery (right side vocal chords paralysis)      Heart - Family Hx:  Brother      Diabetes - Family Hx:  Brother      Cancer/Type - Family Hx:  Mother, Father, Brother, Sister      Is Father Still Living?:  No      Is Mother Still Living?:  No       Family History:  Yes      Social History:  Tobacco Use; No Alcohol Use, No Recreational Drug use      Smoking status:  Current every day smoker (1 ppd x 55y)      Anticoagulation Therapy:  No      Antibiotic Prophylaxis:  No      Medical History:  Yes: Stroke (2018), Miscellaneous Medical/oth (sarcoidosis)            PREVENTION      Hx Influenza Vaccination:  Yes      Date Influenza Vaccine Given:  Nov 1, 2018      2 or More Falls Past Year?:  No      Fall Past Year with Injury?:  No      Hx Pneumococcal Vaccination:  Yes      Encouraged to follow-up with:  PCP regarding preventative exams.      Chart initiated by      greta mackay/ ma            ALLERGIES/MEDICATIONS      Allergies:        Coded Allergies:             NO KNOWN ALLERGIES (Unverified , 8/7/19)      Medications    Last Reconciled on 8/7/19 15:51 by MALENA CEE MD      [facility]  Bromide (Incruse Ellipta) 62.5 Mcg Blst.w.dev      1 PUFF INH RTQDAY, #1 MDI 5 Refills         Prov: MALENA CEE         8/7/19       Montelukast Sodium (Singulair*) 10 Mg Tablet      10 MG PO HS, #30 TAB 0 Refills         Reported         8/7/19       predniSONE* (predniSONE*) 20 Mg Tablet      20 MG PO QDAY, TAB 0 Refills         Reported         8/7/19       Rosuvastatin Calcium (Crestor*) 10 Mg Tablet      10 MG PO HS, #30 TAB 0 Refills         Reported         8/7/19       traZODone HCl (traZODone HCl*) 150 Mg Tablet      150 MG PO HS, #30 TAB 0 Refills         Reported         8/7/19       Pantoprazole (Protonix*) 40 Mg Tablet.dr      40 MG PO HS, #30 TAB 0 Refills         Reported         8/7/19       Diltiazem 24HR ER (XR) (Diltiazem 24HR ER (XR)) 120 Mg Cap.er.deg      120 MG PO QDAY, CAP.ER         Reported         8/7/19       Fluticasone/Vilanterol 200-25 Mcg Inh (Breo Ellipta 200-25 Mcg Inh) 1 Each     Blst.w.dev      10 PUFF INH QDAY, #1 INH         Reported         8/7/19       Aspirin EC (Aspirin EC) 81 Mg Tablet.dr      81 MG PO QDAY, #30 TAB.EC 0 Refills         Reported         8/7/19       Clopidogrel Bisulfate (Plavix) 75 Mg Tablet      75 MG PO QDAY, #30 TAB 0 Refills         Reported         8/7/19       MDI-Albuterol (Ventolin HFA) 18 Gm Hfa.aer.ad      2 PUFFS INH Q4H PRN for SHORTNESS OF BREATH, #1 INH 0 Refills         Reported         8/7/19      Current Medications      Current Medications Reviewed 8/7/19            EXAM      Vital Signs Reviewed.      General:  Thin and chronically ill appearing, Alert, NAD.      HEENT: PERRL, EOMI.  OP, nares clear, no sinus tenderness.      Neck: Supple, no JVD, no thyromegaly.      Lymph: No axillary, cervical, supraclavicular lymphadenopathy noted bilaterally.      Chest: Barrel chested, poor \aeration, trace bibasilar rhonchi, tympanic to     percussion bilaterally, no work of breathing noted.      CV: RRR, no MGR, pulses 2+, equal.        Abd:  Soft, NT, ND, +BS, no HSM.      EXT: No clubbing, no cyanosis, no edema, no joint tenderness.        Neuro:  A  Skin: No rashes or lesions.      Vtials      Vitals:             Height 6 ft 1 in / 185.42 cm           Weight 136 lbs 9 oz / 61.232751 kg           BSA 1.83 m2           BMI 18.0 kg/m2           Temperature 98.2 F / 36.78 C - Oral           Pulse 91           Respirations 14           Blood Pressure 141/67 Sitting, Left Arm           Pulse Oximetry 96%, roomair            REVIEW      Results Reviewed      PCCS Results Reviewed?:  Yes Prev Lab Results, Yes Prev Radiology Results, Yes     Previous [facility] Records      Lab Results      I reviewed office notes from referring provider.  I reviewed labs showing no     peripheral eosinophilia and no evidence of chronic hypercapnic respiratory     failure.  I also reviewed office notes and x-rays showing some COPD changes.            Assessment      GRAVES (dyspnea on exertion) - R06.09            Cough - R05            COPD (chronic obstructive pulmonary disease)         Centrilobular emphysema - J43.2         COPD type: emphysema         Emphysema type: centrilobular            Malnutrition         Moderate protein-calorie malnutrition - E44.0         Protein-calorie malnutrition severity: moderate            Sarcoidosis - D86.9            Notes      New Medications      * MDI-Albuterol (Ventolin HFA) 18 GM HFA.AER.AD: 2 PUFFS INH Q4H PRN SHORTNESS       OF BREATH #1      * Clopidogrel Bisulfate (Plavix) 75 MG TABLET: 75 MG PO QDAY #30      * Aspirin EC 81 MG TABLET.DR: 81 MG PO QDAY #30      * Fluticasone/Vilanterol 200-25 Mcg Inh (Breo Ellipta 200-25 Mcg Inh) 1 EACH       BLST.W.DEV: 10 PUFF INH QDAY #1      * Diltiazem 24HR ER (XR) 120 MG CAP.ER.DE MG PO QDAY         Instructions: 1 CAP      * Pantoprazole (Protonix*) 40 MG TABLET.DR: 40 MG PO HS #30         Instructions: Take on an empty stomach.      * traZODone HCl (traZODone HCl*) 150 MG TABLET: 150 MG  PO HS #30      * Rosuvastatin Calcium (Crestor*) 10 MG TABLET: 10 MG PO HS #30      * predniSONE* 20 MG TABLET: 20 MG PO QDAY      * MONTELUKAST SODIUM (Singulair*) 10 MG TABLET: 10 MG PO HS #30      * UMECLIDINIUM BROMIDE (Incruse Ellipta) 62.5 MCG BLST.W.DEV: 1 PUFF INH RTQDAY       #1         Dx: GRAVES (dyspnea on exertion) - R06.09      New Diagnostics      * PFT-Comp, PrePost,DLCO,BodyBox, Week         Dx: GRAVES (dyspnea on exertion) - R06.09      * 6 Min Walk With Pulse Ox, Routine         Dx: GRAVES (dyspnea on exertion) - R06.09      * CBC, Month         Dx: GRAVES (dyspnea on exertion) - R06.09      * Comp Metabolic Panel, Month         Dx: GRAVES (dyspnea on exertion) - R06.09      * Alpha 1 Antitrypsin , Month         Dx: GRAVES (dyspnea on exertion) - R06.09      * Chest W/O Cont CT, As Soon As Possible         Dx: GRAVES (dyspnea on exertion) - R06.09      IMPRESSION:      1.  Sarcoidosis, unclear how this was diagnosed.  The patient has been on     chronic daily prednisone for about one year and needs to stop it.  We are     unclear of the status of the sarcoid or how this was diagnosed.        2. Chronic immunosuppressed status on prednisone.      3.  Chronic cough.      4. Chronic dyspnea.      5. Emphysema and COPD of unclear severity, needs further workup for this.     Reportedly saw pulmonologist, however I do not have those records.      6. Tobacco abuse with cigarettes, still actively smoking.      7.  Moderate protein calorie malnutrition with a BMI of 18.               PLAN:      1.  I performed exhale nitric oxide testing in the office today, despite     multiple attempts he could not finish the test and I do not have a number to     interpret degree of eosinophilic airway inflammation.       2.  I am going to get all of his records from [facility] including    biopsy reports, path reports, progress notes and PFTs.  I am also getting his     records from his pulmonologist that he saw once up in  [place].      3. Check full PFTs and six minute walk test.      4. Check CBC and CMP.      5. We will check noncontrast chest CT now.      6. Check alpha 1 antitrypsin level and genotype.      7.  Continue Breo and start Incruse one puff daily.  Inhaler education provided     today.      8. Continue Singulair.      9.  I am not sure why the patient is still on steroids and I need to be begin     weaning him off. We will decrease prednisone to 10 mg daily for one month, and     then stop it afterwards.      10. Up-to-date with flu, Prevnar and Pneumovax.      11.  Smoking cessation counseling provided.  I spent 5 minutes today counseling     the patient on risks of smoking, including throat cancer, lung cancer, COPD,     heart disease and death. I also discussed the benefits of quitting.  I offered     him nicotine replacement therapy and pharmacotherapy, but he declined.        12. I am going to see the patient in 2-3 months. At this point, I need to     confirm the actual diagnosis of sarcoidosis and review his chest imaging before     I do further workup for evaluation. I think his respiratory symptoms are related    to his COPD, but I am unclear of the sarcoidosis diagnosis.  If he does have     sarcoid, we will make referral next week for annual eye exam as well as annual     EKG.      13.  I spent 4 minutes counseling the patient on diet and exercise.  I     encouraged the patient to do 30 minutes of daily exercise and a 3000 calorie     diet.  I offered him dietary referral, but he declined.            Patient Education      ACO BMI LOW BELOW 18.5:  Counseling given, Encouraged dietary supplements      Tobacco Cessation Counseling:  for 3 - 10 minutes      Patient Education Provided:  COPD, How to use an Inhaler, Smoking Cessation            Patient Education:        Chronic Obstructive Pulmonary Disease      Sarcoidosis                 Disclaimer: Converted document may not contain table formatting or lab  diagrams. Please see [email or web address] for the authenticated document.

## 2021-05-28 NOTE — PROGRESS NOTES
Patient: HERNESTO MCKEON     Acct: [id number]     Report: #[id number]  UNIT #: [id number]     : 1950    Encounter Date:2021  PRIMARY CARE: DANIA GROSSMAN  ***Signed***  --------------------------------------------------------------------------------------------------------------------  Chief Complaint      Encounter Date      2021            Primary Care Provider            dania grossman            Referring Provider            dania grossman            Patient Complaint      Patient is complaining of      PT here today for F/U, COPD            VITALS      Height 6 ft 1 in / 185.42 cm      Weight 138 lbs  / 62.628383 kg      BSA 1.84 m2      BMI 18.2 kg/m2      Temperature 98.3 F / 36.83 C - Temporal      Pulse 79      Respirations 16      Blood Pressure 103/62 Sitting, Right Arm      Pulse Oximetry 96%, room air      Initial Exhaled Nitrous Oxide      Date:  Aug 7, 2019            HPI      The patient is a 70 year old male patient of Dr. Haro's with profound     malnourishment, chronic obstructive pulmonary disease and active cigarette     smoker who presents for follow up today. The patient's wife is also present in     the office today and providing history.             The patient states his primary care provider started him on nocturnal oxygen.     The patient states he is wearing oxygen at night at 2 liters per minute via     nasal cannula. The patient states he has gained 6 pounds since his last office     visit. The patient states he gets short of breath that is moderate in severity,     worse with exertion, improved with rest. The patient states he has not had to     take any antibiotics or steroids and denies any hospitalizations since his last     visit. The patient states he is taking Brovana and Pulmicort and Yupelri     everyday as prescribed along with daily Daliresp. The patient denies any fever     or chills, night sweats, hemoptysis,  purulent sputum production, swollen glands     in head and neck, unintentional weight loss, chest pain or chest tightness,     abdominal pain, nausea or vomiting or diarrhea. The patient denies  any     headaches, myalgias, sore throat, changes in sense of taste and smell any     coronavirus or flu like symptoms.  The patient denies any leg swelling,     orthopnea or paroxysmal nocturnal dyspnea.  The patient states he is using his     flutter valve with sodium chloride nebulizer to assist with airway clearance.     The patient states he is able to perform his activities of daily living. The     patient would like a 6 minute walk test in the office today to see if he     qualifies for daytime oxygen. The patient states he is still smoking a pack of     cigarettes a day and is interested in quitting.  The patient states he has some     nasal congestion and would like to have a nasal spray.              I reviewed the Review of Systems, medical, surgical and family history and agree    with those as entered.            ROS      Constitutional:  Denies: Fatigue, Fever, Weight gain, Weight loss, Chills,     Insomnia, Other      Respiratory/Breathing:  Complains of: Shortness of air, Wheezing, Cough; Denies:    Hemoptysis, Pleuritic pain, Other      Endocrine:  Denies: Polydipsia, Polyuria, Heat/cold intolerance, Diabetes, Other      Eyes:  Denies: Blurred vision, Vision Changes, Other      Ears, nose, mouth, throat:  Denies: Congestion, Dysphagia, Hearing Changes, Nose    Bleeding, Nasal Discharge, Throat pain, Tinnitus, Other      Cardiovascular:  Denies: Chest Pain, Exertional dyspnea, Peripheral Edema,     Palpitations, Syncope, Wake up Gasping for air, Orthopnea, Tachycardia, Other      Gastrointestinal:  Denies: Abdominal pain/cramping, Bloody stools, Constipation,    Diarrhea, Melena, Nausea, Vomiting, Other      Genitourinary:  Denies: Dysuria, Urinary frequency, Incontinence, Hematuria,     Urgency, Other      Musculoskeletal:  Denies: Joint Pain, Joint  Stiffness, Joint Swelling, Myalgias,    Other      Hematologic/lymphatic:  DENIES: Lymphadenopathy, Bruising, Bleeding tendencies,     Other      Neurologic:  Denies: Headache, Numbness, Weakness, Seizures, Other      Psychiatric:  Denies: Anxiety, Appropriate Effect, Depression, Other      Sleep:  No: Excessive daytime sleep, Morning Headache?, Snoring, Insomnia?, Stop    breathing at sleep?, Other      Integumentary:  Denies: Rash, Dry skin, Skin Warm to Touch, Other            FAMILY/SOCIAL/MEDICAL HX      Surgical History:  Yes: CABG (aorta repair), Orthopedic Surgery (/ left arm     crushed/ johny in right leg), Throat Surgery (right side vocal chords paralysis)      Heart - Family Hx:  Brother      Diabetes - Family Hx:  Brother      Cancer/Type - Family Hx:  Mother, Father, Brother, Sister      Social History:  Tobacco Use      Smoking status:  Current every day smoker ((1 ppd x 55y) still smokes 1 ppd)      Anticoagulation Therapy:  No      Antibiotic Prophylaxis:  No      Medical History:  Yes: Stroke (2018), Miscellaneous Medical/oth (sarcoidosis);     No: Sinus Trouble      Psychiatric History      none            PREVENTION      Hx Influenza Vaccination:  Yes      Date Influenza Vaccine Given:  Nov 1, 2020      Influenza Vaccine Declined:  No      2 or More Falls in Past Year?:  No      Fall Past Year with Injury?:  No      Hx Pneumococcal Vaccination:  Yes      Encouraged to follow-up with:  PCP regarding preventative exams.      Chart initiated by      Paige Sabillon CMA            ALLERGIES/MEDICATIONS      Allergies:        Coded Allergies:             NO KNOWN ALLERGIES (Unverified , 8/28/20)      Medications    Last Reconciled on 2/25/21 13:48 by Ludwin VALADEZ-Fluticasone (Fluticasone 50 mcg) 16 Gm Spray.susp      1 PUFFS NARE EACH QDAY, #1 BOTTLE 4 Refills         Prov: JIMBO SEAY [facility]         2/25/21       Nicotine Polacrilex (Nicotine) 2 Mg Lozenge      2 MG BUCCAL Q4-6H, #180  KYLAH         Prov: JIMBO SEAY [facility]         2/25/21       Nicotine 21 Mg Patch (Nicoderm 21 Mg Patch) 1 Each Patch.td24      21 MG TRANSDERM QDAY for 30 Days, #30 PATCH         Prov: JIMBO SEAY [facility]         2/25/21       Roflumilast (Daliresp) 500 Mcg Tab      500 MCG PO QDAY for 30 Days, #30 TAB 11 Refills         Prov: JIMBO SEAY [facility]         2/25/21       Formoterol Fumarate (Perforomist) 20 Mcg/2 Ml Vial.neb               Prov: MALENA CEE         11/5/20       Revefenacin (YUPELRI) 175 Mcg/3 Ml Nebu               Prov: MALENA CEE         11/5/20       buPROPion HCl (Wellbutrin) 75 Mg Tablet      150 MG PO BID, #120 TAB 3 Refills         Prov: JIMBO SEAY [facility]         7/9/20       MDI-Albuterol (Proair HFA) 8.5 Gm Hfa.aer.ad      2 PUFFS INH Q4-6H PRN for SHORTNESS OF BREATH, #1 MDI 5 Refills         Reported         5/14/20       Neb-NaCl 3% (Sodium Chloride 3% Neb) 4 Ml Vial.neb      4 ML INH RTBID for 30 Days, #60 NEB 5 Refills         Prov: STORMY WATSON PA-C         5/14/20       Neb-Budesonide (Budesonide) 0.5 Mg/2 Ml Ampul.neb      0.5 MG INH RTBID, #60 NEB 11 Refills         Prov: MALENA CEE         5/5/20       Formoterol Fumarate (Perforomist) 20 Mcg/2 Ml Vial.neb      20 MCG INH BID, #60 NEB 11 Refills         Prov: MALENA CEE         5/5/20       Revefenacin (YUPELRI) 175 Mcg/3 Ml Nebu      175 MCG INH RTQDAY for 30 Days, #30 NEB 11 Refills         Prov: MALENA CEE         5/5/20       NEB-Albuterol Sulf (Albuterol Sulfate) 5 Mg/1 Ml Solution      2.5 MG INH RTTID, #2 BOTTLE 0 Refills         Reported         4/15/20       Montelukast Sodium (Singulair*) 10 Mg Tablet      10 MG PO HS, #30 TAB 0 Refills         Reported         8/7/19       Rosuvastatin Calcium (Crestor*) 10 Mg Tablet      10 MG PO HS, #30 TAB 0 Refills         Reported         8/7/19       traZODone HCl (traZODone HCl) 150 Mg Tablet      150 MG PO HS, #30 TAB 0 Refills         Reported          8/7/19       Pantoprazole (Protonix) 40 Mg Tablet.dr      40 MG PO HS, #30 TAB 0 Refills         Reported         8/7/19       dilTIAZem 24Hr ER (dilTIAZem 24Hr ER) 120 Mg Cap.er.deg      120 MG PO QDAY, CAP.ER         Reported         8/7/19       Aspirin EC (Aspirin EC) 81 Mg Tablet.dr      81 MG PO QDAY, #30 TAB.EC 0 Refills         Reported         8/7/19       Clopidogrel Bisulfate (Plavix) 75 Mg Tablet      75 MG PO QDAY, #30 TAB 0 Refills         Reported         8/7/19      Current Medications      Current Medications Reviewed 2/25/21            EXAM      Vital Signs Reviewed      Gen: WDWN, Alert, NAD.        HEENT:  PERRL, EOMI.  OP, nares clear, no sinus tenderness.      Neck:  Supple, no JVD, no thyromegaly.      Lymph: No axillary, cervical, supraclavicular lymphadenopathy noted bilaterally.      Chest:  Poor aeration, barrel chested, mildly decreased breath sounds     throughout, no wheezes, rhonchi or crackles, normal work of breathing noted, the    patient is able to speak full sentences without difficulty.       CV:  RRR, no MGR, pulses 2+, equal.      Abd:  Soft, NT, ND, + BS, no HSM.      EXT:  No clubbing, no cyanosis, no edema, no joint tenderness.       Neuro:  A  Skin: No rashes or lesions.      Vitals      Vitals:             Height 6 ft 1 in / 185.42 cm           Weight 138 lbs  / 62.250271 kg           BSA 1.84 m2           BMI 18.2 kg/m2           Temperature 98.3 F / 36.83 C - Temporal           Pulse 79           Respirations 16           Blood Pressure 103/62 Sitting, Right Arm           Pulse Oximetry 96%, room air            REVIEW      Results Reviewed      PCCS Results Reviewed?:  Yes Prev Lab Results, Yes Prev Radiology Results, Yes     Previous [facility] Records      Lab Results      I personally reviewed Dr. Haro's last office visit notes.            Assessment      Lung nodules - R91.8            Notes      New Medications      * Nicotine 21 Mg Patch (Nicoderm 21 Mg Patch) 1  EACH PATCH.TD24: 21 MG TRANSDERM      QDAY 30 Days #30      * Nicotine Polacrilex (Nicotine) 2 MG LOZENGE: 2 MG BUCCAL Q4-6H #180      * Ludwin-Fluticasone (Fluticasone 50 mcg) 16 GM SPRAY.SUSP: 1 PUFFS NARE EACH QDAY       #1      * REVEFENACIN (YUPELRI) 175 MCG/3 ML NEBU          Sample - Qty 4      * Formoterol Fumarate (Perforomist) 20 MCG/2 ML VIAL.NEB          Sample - Qty 8      Changed Medications      * ROFLUMILAST (Daliresp) 500 MCG TAB: 500 MCG PO QDAY 30 Days #30      New Diagnostics      * Chest W/O Cont CT, 6 Months         Dx: Lung nodules - R91.8      ASSESSMENT:      1. Pseudomonas pneumonia and streptococcal pneumonia now resolved.       2. Chronic obstructive pulmonary disease without acute exacerbation on triple     nebulizer therapy and daily Daliresp.       3. Mucous plugging/issues with airway clearance on flutter valve and sodium     chloride nebulizer.       4. Chronic cough.      5. Chronic dyspnea.       6. Severe protein calorie malnutrition and muscle wasting. The patient has     gained 6 pounds since his last office visit.       7. Tobacco abuse of cigarettes ongoing.       8. Nocturnal hypoxemia on nighttime oxygen.       9. Nasal congestion.              PLAN:      1. Continue Yupelri, Brovana and Pulmicort everyday as prescribed and rinse his     mouth after each use.        2. Continue albuterol inhaler and nebulizer treatment as needed.       3. Continue daily Daliresp everyday as prescribed.       4. Continue flutter valve with 3% saline nebulizer to assist with airway     clearance twice daily.       5. I again offered pulmonary rehab however the patient refuses due to living far    away.       6. I spent five minutes today counseling the patient on smoking cessation.  I     counseled the patient on the risks of continued smoking including the risk of     lung cancer, head and neck cancer, renal cancer, heart disease, stroke, and ear    ly death. Nicotine patches and lozenges were  sent to the office today.  The     patient is advised to decrease the amount of cigarettes to the point where he     can quit.        7. The patient is advised to call the office, call 911 or go to the ER for any     new or worsening symptoms.       8. The patient reports he is up to date with flu and pneumonia vaccines. The     patient is advised to receive the COVID-19 vaccine when available.  The patient     is advised to follow CDC recommendations such as social distancing, wearing a     mask and washing hand for at least 20 seconds.      9. I will prescribe flonase for nasal congestion.       10. 6 minute walk test was performed in the office today and the patient's     oxygen saturation only dropped down to 92% with ambulation therefore the patient    does not qualify for daytime oxygen. The patient is advised to continue oxygen     at night with sleep.       11. Follow up in 4-6 months, sooner if needed.            Patient Education      Tobacco Cessation Counseling:  for 3 - 10 minutes      Patient Education Provided:  COPD, Smoking Cessation      Time Spent:  > 50% /Coord Care            Electronically signed by JIMBO SEAY [facility]  02/26/2021 13:56       Disclaimer: Converted document may not contain table formatting or lab diagrams. Please see [employer] for the authenticated document.

## 2021-05-28 NOTE — PROGRESS NOTES
Patient: HERNESTO MCKEON     Acct: [id number]     Report: #[id number]  UNIT #: [id number]     : 1950    Encounter Date:2020  PRIMARY CARE: DANIA GROSSMAN  ***Signed***  --------------------------------------------------------------------------------------------------------------------  Chief Complaint      Encounter Date      2020            Primary Care Provider            dnaia grossman            Referring Provider            dania grossman            Patient Complaint      Patient is complaining of      Patient here today for 1 WK F/U, COPD, Pneumonia            VITALS      Height 73 in / 185.42 cm      Weight 132 lbs  / 59.457171 kg      BSA 1.80 m2      BMI 17.4 kg/m2      Temperature 98.2 F / 36.78 C - Oral      Pulse 76      Respirations 14      Blood Pressure 111/60 Sitting, Left Arm      Pulse Oximetry 96%, room air      Initial Exhaled Nitrous Oxide      Date:  Aug 7, 2019      Exhaled Nitrous Oxide Results:  0            HPI      The patient is a 70 year old male, patient of Dr. Haro's who is a cigarette     smoker with COPD who presents for follow up visit today. The patient was seen by    Dr. Haro last week on 2020 due to having increasing productive cough     and shortness of breath.  The patient was prescribed Augmentin and a prednisone     taper at that time.  The patient states however he only received the prednisone     taper and did not  Augmentin from the pharmacy.  The patient had given a     sputum culture and it grew pseudomonas aeruginosa and streptococcus pneumoniae     and patient was started on Levaquin 750 mg for 14 days. The patient states that     he is still taking antibiotic. The patient states he has seven days left to     finish. The patient states that since starting antibiotic he is feeling much     better. The patient states his sputum is now clear, his shortness of breath has     improved and he is able to walk further.  The patient currently  denies any     fever, chills, night sweats, hemoptysis, purulent sputum production, swollen     glands in the head and neck, chest pain, chest tightness, abdominal pain,     nausea, vomiting, diarrhea.  The patient denies any headaches, myalgias, changes    in sense of taste and/or smell or any other coronavirus or flu-like symptoms.      He is able to perform ADLs without difficulty.  The patient states he is still     smoking about one pack of cigarettes a day and is needing a refill on Wellbutrin    to help him quit smoking.  The patient states he is taking Perforomist,     budesonide and Yupelri everyday as prescribed. The patient was on daily     Daliresp, however patient states he has been out of medication for a while and     needs to restart medication. The patient states he uses his albuterol inhaler     and DuoNebs as needed.  The patient also has saline nebulizer treatments to     assist with airway clearance. The patient's wife is also present in the office     and is also providing history.  The patient did have a chest x-ray that did not     show any acute disease in the chest and no significant change since previous     chest CT performed on 03/11/2020, shows chronic lung disease and chronic     bronchitis, unchanged.              I have personally reviewed the review of systems, past family, social, surgical     and medical histories and I agree with those as entered in the chart.      Copies To:   MALENA CEE      Constitutional:  Denies: Fatigue, Fever, Weight gain, Weight loss, Chills,     Insomnia, Other      Respiratory/Breathing:  Complains of: Shortness of air, Cough; Denies: Wheezing,    Hemoptysis, Pleuritic pain, Other      Endocrine:  Denies: Polydipsia, Polyuria, Heat/cold intolerance, Diabetes, Other      Eyes:  Denies: Blurred vision, Vision Changes, Other      Ears, nose, mouth, throat:  Denies: Congestion, Dysphagia, Hearing Changes, Nose    Bleeding, Nasal Discharge,  Throat pain, Tinnitus, Other      Cardiovascular:  Denies: Chest Pain, Exertional dyspnea, Peripheral Edema,     Palpitations, Syncope, Wake up Gasping for air, Orthopnea, Tachycardia, Other      Gastrointestinal:  Denies: Abdominal pain/cramping, Bloody stools, Constipation,    Diarrhea, Melena, Nausea, Vomiting, Other      Genitourinary:  Denies: Dysuria, Urinary frequency, Incontinence, Hematuria,     Urgency, Other      Musculoskeletal:  Denies: Joint Pain, Joint Stiffness, Joint Swelling, Myalgias,    Other      Hematologic/lymphatic:  DENIES: Lymphadenopathy, Bruising, Bleeding tendencies,     Other      Neurologic:  Denies: Headache, Numbness, Weakness, Seizures, Other      Psychiatric:  Denies: Anxiety, Appropriate Effect, Depression, Other      Sleep:  No: Excessive daytime sleep, Morning Headache?, Snoring, Insomnia?, Stop    breathing at sleep?, Other      Integumentary:  Denies: Rash, Dry skin, Skin Warm to Touch, Other            FAMILY/SOCIAL/MEDICAL HX      Surgical History:  Yes: CABG (aorta repair), Orthopedic Surgery (/ left arm     crushed/ johny in right leg), Throat Surgery (right side vocal chords paralysis)      Heart - Family Hx:  Brother      Diabetes - Family Hx:  Brother      Cancer/Type - Family Hx:  Mother, Father, Brother, Sister      Is Father Still Living?:  No      Is Mother Still Living?:  No       Family History:  Yes      Social History:  Tobacco Use; No Alcohol Use, No Recreational Drug use      Smoking status:  Current every day smoker (1 ppd x 55y)      Anticoagulation Therapy:  No      Antibiotic Prophylaxis:  No      Medical History:  Yes: Stroke (2018), Miscellaneous Medical/oth (sarcoidosis);     No: Sinus Trouble      Psychiatric History      none            PREVENTION      Hx Influenza Vaccination:  Yes      Date Influenza Vaccine Given:  Nov 1, 2019      2 or More Falls in Past Year?:  No      Fall Past Year with Injury?:  No      Hx Pneumococcal Vaccination:  Yes       Encouraged to follow-up with:  PCP regarding preventative exams.      Chart initiated by      Paige Sabillon CMA            ALLERGIES/MEDICATIONS      Allergies:        Coded Allergies:             NO KNOWN ALLERGIES (Unverified , 7/9/20)      Medications    Last Reconciled on 7/9/20 14:32 by JIMBO SEAY,       Roflumilast (Daliresp) 500 Mcg Tab      500 MCG PO QDAY for 30 Days, #30 TAB 3 Refills         Prov: JIMBO SEAY P[facility]         7/9/20       buPROPion HCl (Wellbutrin) 75 Mg Tablet      150 MG PO BID, #120 TAB 3 Refills         Prov: JIMBO SEAY PCCS         7/9/20       levoFLOXacin (levoFLOXacin) 750 Mg Tablet      750 MG PO QDAY for 14 Days, #14 TAB 0 Refills         Prov: MALENA CEE         7/2/20       predniSONE (Deltasone) 10 Mg Tablet      10 MG PO ASDIR, #45 TAB 0 Refills         Prov: MALENA CEE         6/30/20       MDI-Albuterol (Proair HFA) 8.5 Gm Hfa.aer.ad      2 PUFFS INH Q4-6H PRN for SHORTNESS OF BREATH, #1 MDI 5 Refills         Reported         5/14/20       Neb-NaCl 3% (Sodium Chloride 3% Neb) 4 Ml Vial.neb      4 ML INH RTBID for 30 Days, #60 NEB 5 Refills         Prov: Nini Pino PA-C         5/14/20       Neb-Budesonide (Budesonide) 0.5 Mg/2 Ml Ampul.neb      0.5 MG INH RTBID, #60 NEB 11 Refills         Prov: MALENA CEE         5/5/20       Formoterol Fumarate (Perforomist) 20 Mcg/2 Ml Vial.neb      20 MCG INH BID, #60 NEB 11 Refills         Prov: MALENA CEE         5/5/20       Revefenacin (YUPELRI) 175 Mcg/3 Ml Nebu      175 MCG INH RTQDAY for 30 Days, #30 NEB 11 Refills         Prov: MALENA CEE         5/5/20       NEB-Albuterol Sulf (Albuterol Sulfate) 5 Mg/1 Ml Solution      2.5 MG INH RTTID, #2 BOTTLE 0 Refills         Reported         4/15/20       Montelukast Sodium (Singulair*) 10 Mg Tablet      10 MG PO HS, #30 TAB 0 Refills         Reported         8/7/19       Rosuvastatin Calcium (Crestor*) 10 Mg Tablet      10 MG PO HS, #30 TAB 0  Refills         Reported         8/7/19       traZODone HCl (traZODone HCl) 150 Mg Tablet      150 MG PO HS, #30 TAB 0 Refills         Reported         8/7/19       Pantoprazole (Protonix) 40 Mg Tablet.dr      40 MG PO HS, #30 TAB 0 Refills         Reported         8/7/19       dilTIAZem 24Hr ER (dilTIAZem 24Hr ER) 120 Mg Cap.er.deg      120 MG PO QDAY, CAP.ER         Reported         8/7/19       Aspirin EC (Aspirin EC) 81 Mg Tablet.dr      81 MG PO QDAY, #30 TAB.EC 0 Refills         Reported         8/7/19       Clopidogrel Bisulfate (Plavix) 75 Mg Tablet      75 MG PO QDAY, #30 TAB 0 Refills         Reported         8/7/19      Current Medications      Current Medications Reviewed 7/9/20            EXAM      Vital Signs Reviewed.      General:  Thin, alert, in no apparent distress.        HEENT: PERRL, EOMI.  OP, nares clear, no sinus tenderness.      Neck: Supple, no JVD, no thyromegaly.      Lymph: No axillary, cervical, supraclavicular lymphadenopathy noted bilaterally.      Chest: Barrel chested, poor aeration, decreased breath sounds throughout, no     wheezes, rales or rhonchi appreciated, normal work of breathing noted.  Patient     is able to speak full sentences without difficulty.        CV: RRR, no MGR, pulses 2+, equal.        Abd: Soft, NT, ND, +BS, no HSM.      EXT: No clubbing, no cyanosis, no edema, no joint tenderness.        Neuro:  A  Skin: No rashes or lesions.      Vitals      Vitals:             Height 73 in / 185.42 cm           Weight 132 lbs  / 59.142193 kg           BSA 1.80 m2           BMI 17.4 kg/m2           Temperature 98.2 F / 36.78 C - Oral           Pulse 76           Respirations 14           Blood Pressure 111/60 Sitting, Left Arm           Pulse Oximetry 96%, room air            REVIEW      Results Reviewed      PCCS Results Reviewed?:  Yes Prev Lab Results, Yes Prev Radiology Results, Yes     Previous Mecial Records      Lab Results      I reviewed patient's microbiology  results, sputum culture results and patient's     chest x-ray.      Radiographic Results               [facility]                PACS RADIOLOGY REPORT            Patient: HERNESTO MCKEON   Acct: #[id number]   Report: #[id number]            UNIT #: [id number]    DOS: 20 1637      INSURANCE:[employer]   ORDER #:[id number]      LOCATION:[facility]     : 1950            PROVIDERS      ADMITTING:     ATTENDING: MALENA CEE      FAMILY:  NONE,MD   ORDERING:  MALENA CEE         OTHER:    DICTATING:  Candelario Mcnally MD            REQ #:[id number]   EXAM:CXR2 - CHEST 2V AP PA LAT      REASON FOR EXAM:        REASON FOR VISIT:  PNEUMONIA/COPD            *******Signed******         PROCEDURE:   CHEST AP/PA AND LATERAL             COMPARISON:   [facility], CT, CHEST W/O CONTRAST,     3/11/2020, 12:01.             INDICATIONS:   COPD AND ABNORMAL WEIGHT LOSS. PATIENT STATES HE HAS SARCOIDOSIS.             FINDINGS:         Two plain film images chest are compared previous CT of the chest performed on     3/11/2020.  Today's       study reveals the patient is status post surgical fusion in the inferior     cervical spine.  The heart       mediastinum are normal.  There are bilateral diffuse increased markings     throughout the lungs       consistent chronic lung disease unchanged since previous study.  No evidence of     pneumonia or       pleural effusion or pneumothorax or mass in the right or left lungs.             CONCLUSION:         1. No acute disease in the chest and no significant change since previous CT     chest performed on       3/11/2020.      2. Chronic lung disease and chronic bronchitis unchanged              CANDELARIO MCNALLY MD             Electronically Signed and Approved By: CANDELARIO MCNALLY MD on 2020 at     17:19                        Until signed, this is an unconfirmed  preliminary report that may contain      errors and is subject to change.                                              FIGUEROA:      D:06/30/20 1719            Assessment      Notes      New Medications      * ROFLUMILAST (Daliresp) 500 MCG TAB: 500 MCG PO QDAY 30 Days #30         Instructions: Take 250mcg po qd x 1 month then increase to 500mcg po qd to        continue.      * ROFLUMILAST (Daliresp) 250 MCG TABLET          Sample - Qty 1      * Formoterol Fumarate (Perforomist) 20 MCG/2 ML VIAL.NEB          Sample - Qty 4      * REVEFENACIN (YUPELRI) 175 MCG/3 ML NEBU          Sample - Qty 4      Renewed Medications      * buPROPion HCl (Wellbutrin) 75 MG TABLET: 150 MG PO BID #120      IMPRESSION:      1.  COPD, acute exacerbation.      2.   Sputum culture positive for pseudomonas aeruginosa and streptococcus     pneumoniae.  The patient currently being treated with Levaquin.      3. Unintentional weight loss.      4. Worsening acute fatigue, likely infectious related.      5.  Acute on chronic dyspnea, improved.      6. Recurrent chronic cough.      7.  Moderate COPD.      8. Chronic bronchitis, phenotype, FEV1 of 69% predicted.  The patient is out of     Daliresp, still having frequent exacerbations.      9. Tobacco abuse with cigarettes, ongoing.      10. Moderate protein calorie malnutrition with muscle wasting, BMI of 17.4.              PLAN:      1.  The patient is to continue Levaquin and prednisone taper as prescribed.      2.  The patient is already scheduled to have a CT scan of the chest, abdomen and    pelvis on 08/17/2020 given the constipation and weight loss to evaluate for     malignant etiology. The patient is advised to have test completed as scheduled.      3. The patient to continue aggressive airway clearance.      4.  Encouraged 3000 calorie a day diet and 30 minutes of daily exercise.  I     spent four minutes counseling patient regarding his malnutrition.      5.  I spent four minutes today  counseling the patient on smoking cessation.  I     counseled the patient on the risks of continued smoking including the risk of     lung cancer, head and neck cancer, renal cancer, heart disease, stroke, and     early death.  The patient is to continue Wellbutrin as prescribed. The patient     is advised to decrease the number of cigarettes he is smoking up to the point     where he can quit.      6. The patient is to continue Yupelri, budesonide and Perforomist everyday as     prescribed and rinse his mouth out after each use.      7. The patient to continue DuoNebs and albuterol inhaler as needed.      8.  The patient reports he has been out of daily Daliresp and would like to     restart medication.  The patient is to restart Daliresp at 250 mcg once daily     for 30 days and increase to 500, prescription sent to pharmacy today.      9.  Patient is advised to call the office, 911 or go to the ER with any new or     worsening symptoms.      10. Up-to-date with flu, Prevnar and Pneumovax.      11. The patient is advised to follow up with Dr. Haro in August after CT scan    is completed, sooner if needed.            Patient Education      ACO BMI LOW BELOW 18.5:  Counseling given, Encouraged dietary supplements      Tobacco Cessation Counseling:  for 3 - 10 minutes      Patient Education Provided:  COPD, Smoking Cessation      Time Spent:  > 50% /Coord Care            Electronically signed by JIMBO SEAY [facility]  07/13/2020 08:55       Disclaimer: Converted document may not contain table formatting or lab diagrams. Please see [employer] for the authenticated document.

## 2021-08-25 PROBLEM — E43 SEVERE MALNUTRITION (HCC): Status: ACTIVE | Noted: 2021-01-01

## 2021-08-25 PROBLEM — R10.13 EPIGASTRIC PAIN: Status: ACTIVE | Noted: 2021-01-01

## 2021-08-25 PROBLEM — J43.2 CENTRILOBULAR EMPHYSEMA: Status: ACTIVE | Noted: 2021-01-01

## 2021-08-25 PROBLEM — R42 DIZZINESS: Status: ACTIVE | Noted: 2021-01-01

## 2021-08-25 PROBLEM — R63.4 UNINTENDED WEIGHT LOSS: Status: ACTIVE | Noted: 2021-01-01

## 2021-08-25 PROBLEM — Z72.0 TOBACCO ABUSE: Status: ACTIVE | Noted: 2021-01-01

## 2021-08-25 NOTE — PROGRESS NOTES
CHIEF COMPLAINT    Primary Care Provider  Lucero Jones MD     Referring Provider  No ref. provider found    Patient Complaint  COPD (4-6 month Follow up), Cough (Productive- yellow), Wheezing, Shortness of Breath, Fatigue, and Results (Chest CT)        Subjective          Domingo Greene presents to [facility]      History of Presenting Illness  Domingo Greene is a 71 y.o. male here for follow-up for COPD.  He states he has been on maximum nebulizer therapy and still not doing well.  His dyspnea is unchanged.  He gets short of breath walking 300 feet.  It is moderate severity, worse with exertion and relieved with rest.  He has a hacking cough with thin clear sputum which is at baseline and also wheezing with activity.  Over the last 4 to 5 months he reports increasing dyspnea with activity and states he has had some presyncopal events.  He denies any chest pain.  He has not had a recent echocardiogram or stress test.  Also he is complaining of abdominal pain after eating.  There is no trouble swallowing solids or liquids.  It is constant, epigastric, 4 out of 10 in severity, worse with meals.  He is on heartburn medication and states this does not feel like heartburn.  He has had a EGD in the past but he is not remember who did it.  He also reports unintentional weight loss of about 10 to 15 pounds.  His primary care is giving him steroids to help improve his appetite but is still poor.  He is very malnourished with a BMI of 17 today.  Unfortunately, he still continues to smoke and is smoking about 1 pack of cigarettes a day.    Denies headaches, chest pain, weight loss or hemoptysis. Denies fevers, chills and night sweats. He is able to perform ADLs without difficulties and denies and swollen glands/lymph nodes in the head or neck.    I have personally reviewed the review of systems, past family, social, medical and surgical histories; and agree with their  findings.        Review of Systems  Constitutional symptoms:  Denied complaints   Ear, nose, throat: Denied complaints  Cardiovascular:  Denied complaints  Respiratory: Dyspnea, cough, wheeze, otherwise denied complaints  Gastrointestinal: Abdominal pain, otherwise denied complaints  Musculoskeletal: Denied complaints      Family History   Problem Relation Age of Onset   • Cancer Mother    • Cancer Father    • Hypertension Sister    • Cancer Sister    • Asthma Brother    • Cancer Brother    • Diabetes Brother    • Emphysema Brother    • Heart failure Brother    • Hypertension Brother         Social History     Socioeconomic History   • Marital status:      Spouse name: Not on file   • Number of children: Not on file   • Years of education: Not on file   • Highest education level: Not on file   Tobacco Use   • Smoking status: Current Every Day Smoker     Packs/day: 1.00     Types: Cigarettes     Start date: 1962   • Smokeless tobacco: Never Used   Vaping Use   • Vaping Use: Never used   Substance and Sexual Activity   • Alcohol use: Not Currently   • Drug use: Defer        Past Medical History:   Diagnosis Date   • Asthma, extrinsic    • COPD (chronic obstructive pulmonary disease) (CMS/[facility])    • GERD (gastroesophageal reflux disease)    • Hypertension         Immunization History   Administered Date(s) Administered   • COVID-19 (BERNIE) 03/04/2021   • Influenza LAIV (Nasal) 11/16/2020         No Known Allergies       Current Outpatient Medications:   •  clonazePAM (KlonoPIN) 0.5 MG tablet, Take 0.5 mg by mouth At Night As Needed., Disp: , Rfl:   •  clopidogrel (PLAVIX) 75 MG tablet, Take 75 mg by mouth Daily., Disp: , Rfl:   •  dilTIAZem CD (CARDIZEM CD) 120 MG 24 hr capsule, , Disp: , Rfl:   •  fluticasone (FLONASE) 50 MCG/ACT nasal spray, , Disp: , Rfl:   •  HYDROcodone-acetaminophen (NORCO)  MG per tablet, Take 1 tablet by mouth Every 8 (Eight) Hours As Needed., Disp: , Rfl:   •  omeprazole  "(priLOSEC) 20 MG capsule, , Disp: , Rfl:   •  predniSONE (DELTASONE) 10 MG tablet, Take 10 mg by mouth Daily., Disp: , Rfl:   •  propranolol (INDERAL) 80 MG tablet, , Disp: , Rfl:   •  rosuvastatin (CRESTOR) 10 MG tablet, , Disp: , Rfl:   •  sildenafil (REVATIO) 20 MG tablet, TAKE THREE TABLETS BY MOUTH 30 MINTUES PRIOR TO SEXUAL INTERCOURSE, Disp: , Rfl:   •  simvastatin (ZOCOR) 40 MG tablet, , Disp: , Rfl:   •  sulfamethoxazole-trimethoprim (BACTRIM DS,SEPTRA DS) 800-160 MG per tablet, TAKE ONE TABLET BY MOUTH TWICE DAILY FOR 7 DAYS, Disp: , Rfl:   •  triamcinolone (KENALOG) 0.1 % cream, Apply to affected area twice a day., Disp: , Rfl:   •  predniSONE (DELTASONE) 5 MG tablet, TAKE TWO TABLETS BY MOUTH EVERY DAY FOR 15 DAYS THEN TAKE ONE TABLET BY MOUTH EVERY DAY FOR 15 DAYS, Disp: , Rfl:      Objective     Vital Signs:   /58   Pulse 67   Temp 98.2 °F (36.8 °C) (Temporal)   Resp 14   Ht 185.4 cm (73\")   Wt 59.4 kg (131 lb)   SpO2 97% Comment: 2.5L at night  BMI 17.28 kg/m²   Physical Exam  Vital Signs Reviewed   Thin frail male, Alert, NAD.    HEENT:  PERRL, EOMI.  OP, nares clear  Neck:  Supple, no JVD, no thyromegaly  Chest:   Barrel chested, poor aeration, bibasilar rhonchi, tympanic to percussion bilaterally, pursed lip breathing noted CV: RRR, no MGR, pulses 2+, equal.  Abd:  Soft, NT, ND, + BS, no HSM  EXT:  no clubbing, no cyanosis, no edema muscle wasting noted in all 4 extremities,  Neuro:  A&Ox3, CN grossly intact, no focal deficits.  Skin: No rashes or lesions noted       Result Review :   I have personally reviewed the last office note.  Also personally the chest CT from May 2021 showing some groundglass opacifications and severe emphysematous changes         Assessment and Plan      Patient Active Problem List   Diagnosis   • Dizziness   • Epigastric pain   • Unintended weight loss   • Centrilobular emphysema (CMS/HCC)   • Severe malnutrition (CMS/HCC)   • Tobacco abuse "         Impression:  Abdominal pain, epigastric  GERD without esophagitis  Unintentional weight loss  Presyncope/dizziness with activity  Severe COPD without exacerbation.  Gold stage D COPD on Daliresp and triple nebulizer therapy.  Still with poor disease control.  COPD assessment score is 25 today.  He refuses pulmonary rehab  Mucous plugging/issues with airway clearance  Chronic dyspnea at baseline aside chronic cough at baseline  Chronic wheezing at baseline  Severe protein calorie malnutrition with muscle wasting  Ongoing tobacco use of cigarettes    Plan:  I am really concerned about his persistent epigastric pain and unintentional weight loss.  I am going to refer the patient to Dr. Dominique gastroenterology for an upper endoscopy  Check CBC and CMP  Check CT of the abdomen and pelvis with contrast given unintentional weight loss and abdominal pain.  Will check noncontrast chest CT to evaluate lung parenchyma and groundglass opacities seen in May  Continue pellety, performance, Pulmicort with albuterol as needed   refuses pulmonary rehab  He is now off the Daliresp given the weight loss.  Steroids per primary to promote weight gain.  If this did not help, may need to consider giving him Megace or Marinol  Diet and exercise counseling provided today.  Recommended 3000 -calorie/day diet as well as 15 to 30 minutes of daily exercise.  Patient verbalized understanding regarding this.  Offered dietary referral but the patient declined.  Total time of counseling today was 4 minutes  Domingo Greene  reports that he has been smoking cigarettes. He started smoking about 59 years ago. He has been smoking about 1.00 pack per day. He has never used smokeless tobacco.. I have educated him on the risk of diseases from using tobacco products such as cancer, COPD and heart disease. I advised him to quit and he is willing to quit. We have discussed the following method/s for tobacco cessation:  Education Material [place].   Together we have set a quit date for 2 weeks from today.  He will follow up with me in 4 week or sooner to check on his progress. I spent 4 minutes counseling the patient.  Vaccination status: Up-to-date with flu, Prevnar, Pneumovax, COVID-19 vaccination.  To get flu vaccine this fall.  Medications personally reviewed    Follow Up   Return in about 3 months (around 11/25/2021).  Patient was given instructions and counseling regarding his condition or for health maintenance advice. Please see specific information pulled into the AVS if appropriate.     Electronically signed by Cesar Haro MD, 08/25/21, 4:14 PM EDT.

## 2021-11-08 PROBLEM — R19.4 CHANGE IN BOWEL HABIT: Status: ACTIVE | Noted: 2021-01-01

## 2021-11-08 NOTE — PROGRESS NOTES
Patient Name: Domingo Greene   Visit Date: 11/08/2021   Patient ID: [id number]  Provider: LEOPOLDO Gonzalez    Sex: male  Location:  Location Address:  Location Phone: [phone]  [place] [address] [phone]    YOB: 1950  Age: 71 y.o.   Primary Care Provider Lucero Jones MD      Referring Provider: No ref. provider found        Chief Complaint  Abdominal Pain (Pt states he has abd pain all the time, worse after eating) and Weight Loss (Pt states he was on steroids in order to gain weight but he is losing rather than gaining)    History of Present Illness     New pt w c/o epigastric abd pain after meals, wt loss started 3 yrs ago w dx of sarcoidosis, states has been gradual. Pt has c/o HB and indigestion, has been on omeprazole x 4-5 yrs ago. States Bm's have been irregular, may go a couple days w no BM, may have diarrhea, this is a change for him. +nausea, no vomiting. Appetite fair, states he eats frequently. Pt also describes palpable   Last colonoscopy 2016 in [place]  Has never had an EGD    CT the abdomen and pelvis with contrast 10/1/2021: Lung nodule noted, 2 indeterminant left renal lesions with MRI was recommended, there were no GI findings  Sees Dr Haro for sarcoidosis  Plavix d/t hx CVA, does not see cardio   Pt has been vaccinated  Past Medical History:   Diagnosis Date   • Asthma, extrinsic    • COPD (chronic obstructive pulmonary disease) (HCC)    • GERD (gastroesophageal reflux disease)    • Hypertension        Past Surgical History:   Procedure Laterality Date   • CARDIAC SURGERY     • COLONOSCOPY  2016    [place], [place]   • LEG SURGERY Right     Pins and Rods   • NECK SURGERY      Front and back   • OTHER SURGICAL HISTORY      Left arm surgery       No Known Allergies    Family History   Problem Relation Age of Onset   • Cancer Mother    • Cancer Father    • Colon cancer Father    • Hypertension Sister    • Cancer Sister    • Asthma Brother    • Cancer Brother    •  "Diabetes Brother    • Emphysema Brother    • Heart failure Brother    • Hypertension Brother         Social History     Tobacco Use   • Smoking status: Current Every Day Smoker     Packs/day: 1.00     Types: Cigarettes     Start date: 1962   • Smokeless tobacco: Never Used   Vaping Use   • Vaping Use: Never used   Substance Use Topics   • Alcohol use: Not Currently   • Drug use: Defer       Objective     Vital Signs:   /60 (BP Location: Right arm, Patient Position: Sitting, Cuff Size: Adult)   Pulse 68   Ht 185.4 cm (73\")   Wt 61.5 kg (135 lb 9.6 oz)   BMI 17.89 kg/m²       Physical Exam  Constitutional:       General: The patient is not in acute distress.     Appearance: Normal appearance.   HENT:      Head: Normocephalic and atraumatic.      Nose: Nose normal.   Pulmonary:      Effort: Pulmonary effort is normal. No respiratory distress.   Abdominal:      General: Abdomen is flat.      Palpations: Abdomen is soft. There is no mass.      Tenderness: There is no abdominal tenderness. There is no guarding.   Musculoskeletal:      Cervical back: Neck supple.      Right lower leg: No edema.      Left lower leg: No edema.   Skin:     General: Skin is warm and dry.   Neurological:      General: No focal deficit present.      Mental Status: The patient is alert and oriented to person, place, and time.      Gait: Gait normal.   Psychiatric:         Mood and Affect: Mood normal.         Speech: Speech normal.         Behavior: Behavior normal.         Thought Content: Thought content normal.     Result Review :   The following data was reviewed by: LEOPOLDO Gonzalez on 11/08/2021:  CMP    CMP 10/1/21   Creatinine 0.90      Comments are available for some flowsheets but are not being displayed.                         Assessment and Plan    Diagnoses and all orders for this visit:    1. Epigastric pain (Primary)  -     CBC (No Diff); Future  -     Comprehensive Metabolic Panel; Future    2. Unintended " weight loss  -     CBC (No Diff); Future  -     Comprehensive Metabolic Panel; Future    3. Change in bowel habit    Other orders  -     PEG 3350-KCl-NaBcb-NaCl-NaSulf (Golytely) 227.1 g pack; Take 4 L by mouth Daily for 1 day. Take per office instructions  Dispense: 1 each; Refill: 0            Follow Up      EGD/COLONOSCOPY Surgical Risk and Benefits: Possible risks/complications, benefits, and alternatives to surgical or invasive procedure have been explained to patient and/or legal guardian; Patient has been evaluated and can tolerate anesthesia and/or sedation. Risks, benefits, and alternatives to anesthesia and sedation have been explained to patient and/or legal guardian. Clearance Dr Haro, Plavix per PCP.  F/U w PCP regarding renal lesions on CT  Check labs    Patient was given instructions and counseling regarding his condition or for health maintenance advice. Please see specific information pulled into the AVS if appropriate.

## 2021-11-12 NOTE — TELEPHONE ENCOUNTER
----- Message from LEOPOLDO Gonzalez sent at 11/10/2021 12:16 PM EST -----  Pt has some abnormalities on his CBC I recommend he f/u with PCP for repeat, pls fax labs to PCP.  Kidney and liver function look normal, and pt is not anemic.

## 2021-11-30 NOTE — TELEPHONE ENCOUNTER
I called pt to r/s his upcoming ENDO procedure (Dr. Dominique will be OUT on 12.17.21).     No answer - left message for pt to call back. I will try to reach pt again; if I'm unable to do so, I will send letter.

## 2021-12-16 NOTE — PROGRESS NOTES
Primary Care Provider  Lucero Jones MD     Referring Provider  No ref. provider found     Chief Complaint  Emphysema    Subjective          History of Presenting Illness  Patient is a 71-year-old male, patient Dr. Haro's with COPD who presents for follow-up visit today.  Patient's wife is present with patient in the office today.  Patient states that since last visit his breathing is at baseline.  Patient states he will get short of breath that is worse with exertion, moderate in severity, and improved with rest.  Patient states that he was taking Yupelri, Perforomist, and Pulmicort nebulizer treatments however, nebulizer treatments are too expensive and needs a cheaper alternative.  Patient states that he does use his oxygen as needed and with exertion and at night at 2 L/min via nasal cannula.  Patient states he is still smoking and is not interested in quitting at this time.  Patient states that he still has unintentional weight loss and he did have an EGD and a colonoscopy completed by Dr. Dominique last week and is scheduled to follow-up with him for results.  Since last office visit patient did have a CT of the abdomen and pelvis completed on 10/1/2021.  Report showed new 11 mm nodular opacity within the left posterior costophrenic angle compared to prior chest CT dated 8/3/2021.  This is favored to be infectious or inflammatory in etiology. Consider follow-up chest CT after treatment to ensure resolution. Two indeterminate left renal lesions measuring up to 9 mm in size.  These are intracortical and only well seen on the delayed nephrographic/excretory phase.  Dedicated evaluation with MRI of the abdomen renal protocol would be recommended for more definitive characterization.  Patient states he is scheduled to have a follow-up chest CT scan on February 7, 2022.  Patient also had an echocardiogram completed on 10/1/2021.  Echocardiogram report showed normal LV cavity size and systolic function.   Calculated left ventricular ejection fraction of 61.1%.  Left ventricular diastolic function is consistent with grade 2 with high LAP pseudonormalization.  Aortic valve sclerosis without obstruction of flow.  Mild aortic valve regurgitation was noted mild tricuspid valve regurgitation.  Estimated right ventricular systolic pressure from tricuspid regurgitation is mildly elevated at 35 to 45 mmHg.  Patient states that he has seen a cardiologist in the past however is not followed up with one for some time.  Patient states that he also has hoarseness of his voice and did see an ENT several years ago.  Patient would like to establish care with a ENT again. Patient denies fever, chills, night sweats, swollen glands in the head and neck, hemoptysis, purulent sputum production, dysphagia, chest pain, palpitations, chest tightness, abdominal pain, nausea, vomiting, and diarrhea.  Patient also denies any myalgias, changes in sense of taste and/or smell, sore throat, any other coronavirus or flu-like symptoms.  Patient denies any leg swelling, orthopnea, paroxysmal nocturnal dyspnea.  Patient also denies any hematuria, hematochezia, hematemesis, and epistaxis.  Patient does report that he has easy bruising since being on Plavix.  Patient is able to perform activities of daily living.        Review of Systems   Constitutional: Positive for unexpected weight loss. Negative for activity change, appetite change, chills, diaphoresis, fatigue, fever and unexpected weight gain.        Negative for Insomnia   HENT: Positive for voice change (hoarseness). Negative for congestion (Nasal), mouth sores, nosebleeds, postnasal drip, sore throat, swollen glands and trouble swallowing.         Negative for Thrush  Negative for Hoarseness  Negative for Allergies/Hay Fever  Negative for Recent Head injury  Negative for Ear Fullness  Negative for Nasal or Sinus pain  Negative for Dry lips  Negative for Nasal discharge   Respiratory: Positive  for shortness of breath. Negative for apnea, cough, chest tightness and wheezing.         Negative for Hemoptysis  Negative for Pleuritic pain   Cardiovascular: Negative for chest pain, palpitations and leg swelling.        Negative for Claudication  Negative for Cyanosis  Negative for Dyspnea on exertion   Gastrointestinal: Negative for abdominal pain, anal bleeding, blood in stool, diarrhea, nausea, vomiting and GERD.   Genitourinary: Negative for hematuria.   Musculoskeletal: Negative for joint swelling and myalgias.        Negative for Joint pain  Negative for Joint stiffness   Skin: Positive for bruise. Negative for color change, dry skin, pallor and rash.   Neurological: Negative for syncope, weakness and headache.   Hematological: Negative for adenopathy. Does not bruise/bleed easily.        Family History   Problem Relation Age of Onset   • Cancer Mother    • Cancer Father    • Colon cancer Father    • Hypertension Sister    • Cancer Sister    • Asthma Brother    • Cancer Brother    • Diabetes Brother    • Emphysema Brother    • Heart failure Brother    • Hypertension Brother    • Malig Hyperthermia Neg Hx         Social History     Socioeconomic History   • Marital status:    Tobacco Use   • Smoking status: Current Every Day Smoker     Packs/day: 1.00     Types: Cigarettes     Start date: 1962   • Smokeless tobacco: Never Used   Vaping Use   • Vaping Use: Never used   Substance and Sexual Activity   • Alcohol use: Not Currently   • Drug use: Never        Past Medical History:   Diagnosis Date   • Asthma, extrinsic    • COPD (chronic obstructive pulmonary disease) (HCC)    • Erectile dysfunction    • Foot drop, left    • GERD (gastroesophageal reflux disease)    • Hypertension    • Stroke (HCC)     NO RESIDUAL        Immunization History   Administered Date(s) Administered   • COVID-19 (BERNIE) 03/04/2021, 11/04/2021   • Influenza LAIV (Nasal) 11/16/2020       No Known Allergies       Current  Outpatient Medications:   •  albuterol (PROVENTIL) (2.5 MG/3ML) 0.083% nebulizer solution, Take 2.5 mg by nebulization Every 4 (Four) Hours As Needed., Disp: , Rfl:   •  clonazePAM (KlonoPIN) 0.5 MG tablet, Take 0.5 mg by mouth At Night As Needed., Disp: , Rfl:   •  clopidogrel (PLAVIX) 75 MG tablet, Take 75 mg by mouth Daily., Disp: , Rfl:   •  dilTIAZem CD (CARDIZEM CD) 120 MG 24 hr capsule, Take 120 mg by mouth Daily., Disp: , Rfl:   •  fluticasone (FLONASE) 50 MCG/ACT nasal spray, As Needed., Disp: , Rfl:   •  HYDROcodone-acetaminophen (NORCO)  MG per tablet, Take 1 tablet by mouth Every 8 (Eight) Hours As Needed., Disp: , Rfl:   •  meclizine (ANTIVERT) 25 MG tablet, Take 25 mg by mouth 3 (Three) Times a Day As Needed for Dizziness., Disp: , Rfl:   •  pantoprazole (PROTONIX) 40 MG EC tablet, Take 1 tablet by mouth Daily., Disp: 30 tablet, Rfl: 5  •  propranolol (INDERAL) 80 MG tablet, Take 80 mg by mouth 4 (Four) Times a Day As Needed., Disp: , Rfl:   •  rosuvastatin (CRESTOR) 10 MG tablet, Take 10 mg by mouth Daily., Disp: , Rfl:   •  sildenafil (REVATIO) 20 MG tablet, TAKE THREE TABLETS BY MOUTH 30 MINTUES PRIOR TO SEXUAL INTERCOURSE, Disp: , Rfl:   •  traZODone (DESYREL) 300 MG tablet, Take 300 mg by mouth Every Night., Disp: , Rfl:   •  Budeson-Glycopyrrol-Formoterol (Breztri Aerosphere) 160-9-4.8 MCG/ACT aerosol inhaler, Inhale 2 puffs 2 (Two) Times a Day for 30 days., Disp: 4 each, Rfl: 0     Objective     Physical Exam  Vital Signs Reviewed  Thin built, Alert, NAD.    HEENT:  PERRL, EOMI.  OP, nares clear, no sinus tenderness  Neck:  Supple, no JVD, no thyromegaly.  Lymph: no axillary, cervical, supraclavicular lymphadenopathy noted bilaterally  Chest: Barrel chested, decreased breath sounds throughout. No wheezes, rales, or rhonchi appreciated.  Normal work of breathing noted.  Patient is able speak full sentences without difficulty.  CV: RRR, no MGR, pulses 2+, equal.  Abd:  Soft, NT, ND, + BS, no  "HSM  EXT:  no clubbing, no cyanosis, no edema, no joint tenderness  Neuro:  A&Ox3, CN grossly intact, no focal deficits.  Skin: No rashes or lesions noted.  Fuhs bruising on both upper extremities.    Vital Signs:   /70 (BP Location: Left arm, Patient Position: Sitting, Cuff Size: Adult)   Pulse 90   Resp 14   Ht 185.4 cm (73\")   Wt 59 kg (130 lb)   SpO2 96%   BMI 17.15 kg/m²         Result Review :   I have personally reviewed Dr. Haro's last office visit note.         Assessment and Plan      Assessment:  1.  Severe COPD without acute exacerbation.  Gold stage D COPD.  Patient on triple nebulizer therapy and Daliresp.  Continues to refuse pulmonary rehab.  2.  Centrilobular emphysema.  3.  Mucous plugging.  4.  Issues with airway clearance.  5.  Chronic dyspnea.  6.  Chronic wheezing.  7.  Abdominal pain, epigastric.  Patient under the care Dr. Dominique.  8.  Unintentional weight loss.  9.  Severe protein calorie malnutrition with muscle wasting.  10.  Presyncope/dizziness with activity.  11.  Indeterminate renal lesions on CT of abdomen and pelvis.  12.  Tobacco abuse cigarettes ongoing.  13.  Hoarseness.  14.  Abnormal echocardiogram.    Plan:  1.  Patient reports that nebulizer treatments are too expensive.  Samples of Breztri given to patient in the office today.  Patient is advised to take 2 puffs twice daily and rinse mouth out after each use.  2.  Continue albuterol nebulizer treatments as needed.  3.  CT of abdomen and pelvis showing indeterminate renal lesions.  Will order MRI with renal protocol.  4.  Patient is scheduled for repeat chest CT scan on February 7, 2022 at 2:00 PM.  5.  Patient to follow-up with Dr. Dominique as scheduled.  6.  For hoarseness I will refer patient to ENT.  7.  Echocardiogram showing mildly elevated right ventricular systolic pressures and aortic valve sclerosis.  I will refer patient to cardiologist.  8.  Recommend referral to hematology/oncology and additional " labs/work-up for patient's unintentional weight loss however he refuses.  Patient states he is scheduled to follow-up with his primary care provider on January 14, 2022 and would like to discuss with them first before being referred and having further work-up.  Risks of refusing referral and further work-up at this time discussed with the patient such as delay in diagnosis, worsening of condition, etc.  Patient verbalized understanding.  9. I counseled the patient on smoking cessation.  I counseled the patient on the risks of continued smoking including the risk of lung cancer, head and neck cancer, renal cancer, heart disease, stroke, and early death.  Patient refuses nicotine replacement therapy or pharmacotherapy at this time.  Patient is advised to decrease the number of cigarettes they are smoking up until the point to where they can quit.  10.  Patient reports they are up-to-date with flu, pneumonia, and Covid vaccines.  Patient is advised to continue to follow CDC recommendations such as social distancing wearing a mask and washing hands for at least 20 seconds.  11.  Patient's BMI and weight were discussed.  Recommend 3000-calorie/day diet as well as 15 to 30 minutes daily exercise.  Offered dietary referral however patient declined.  12  Patient to call the office, 911, or go to the ER with new or worsening symptoms.  13.  Follow-up in 2 to 3 months, sooner if needed.          Follow Up   Return for 2-3 months with Dr. Haro.  Patient was given instructions and counseling regarding his condition or for health maintenance advice. Please see specific information pulled into the AVS if appropriate.

## 2021-12-16 NOTE — PATIENT INSTRUCTIONS
Chronic Obstructive Pulmonary Disease Exacerbation    Chronic obstructive pulmonary disease (COPD) is a long-term (chronic) condition that affects the lungs. COPD is a general term that can be used to describe many different lung problems that cause lung swelling (inflammation) and limit airflow, including chronic bronchitis and emphysema. COPD exacerbations are episodes when breathing symptoms become much worse and require extra treatment.  COPD exacerbations are usually caused by infections. Without treatment, COPD exacerbations can be severe and even life threatening. Frequent COPD exacerbations can cause further damage to the lungs.  What are the causes?  This condition may be caused by:  · Respiratory infections, including viral and bacterial infections.  · Exposure to smoke.  · Exposure to air pollution, chemical fumes, or dust.  · Things that give you an allergic reaction (allergens).  · Not taking your usual COPD medicines as directed.  · Underlying medical problems, such as congestive heart failure or infections not involving the lungs.  In many cases, the cause (trigger) of this condition is not known.  What increases the risk?  The following factors may make you more likely to develop this condition:  · Smoking cigarettes.  · Old age.  · Frequent prior COPD exacerbations.  What are the signs or symptoms?  Symptoms of this condition include:  · Increased coughing.  · Increased production of mucus from your lungs (sputum).  · Increased wheezing.  · Increased shortness of breath.  · Rapid or labored breathing.  · Chest tightness.  · Less energy than usual.  · Sleep disruption from symptoms.  · Confusion or increased sleepiness.  Often these symptoms happen or get worse even with the use of medicines.  How is this diagnosed?  This condition is diagnosed based on:  · Your medical history.  · A physical exam.  You may also have tests, including:  · A chest X-ray.  · Blood tests.  · Lung (pulmonary) function  tests.  How is this treated?  Treatment for this condition depends on the severity and cause of the symptoms. You may need to be admitted to a hospital for treatment. Some of the treatments commonly used to treat COPD exacerbations are:  · Antibiotic medicines. These may be used for severe exacerbations caused by a lung infection, such as pneumonia.  · Bronchodilators. These are inhaled medicines that expand the air passages and allow increased airflow.  · Steroid medicines. These act to reduce inflammation in the airways. They may be given with an inhaler, taken by mouth, or given through an IV tube inserted into one of your veins.  · Supplemental oxygen therapy.  · Airway clearing techniques, such as noninvasive ventilation (NIV) and positive expiratory pressure (PEP). These provide respiratory support through a mask or other noninvasive device. An example of this would be using a continuous positive airway pressure (CPAP) machine to improve delivery of oxygen into your lungs.  Follow these instructions at home:  Medicines  · Take over-the-counter and prescription medicines only as told by your health care provider. It is important to use correct technique with inhaled medicines.  · If you were prescribed an antibiotic medicine or oral steroid, take it as told by your health care provider. Do not stop taking the medicine even if you start to feel better.  Lifestyle  · Eat a healthy diet.  · Exercise regularly.  · Get plenty of sleep.  · Avoid exposure to all substances that irritate the airway, especially to tobacco smoke.  · Wash your hands often with soap and water to reduce the risk of infection. If soap and water are not available, use hand .  · During flu season, avoid enclosed spaces that are crowded with people.  General instructions  · Drink enough fluid to keep your urine clear or pale yellow (unless you have a medical condition that requires fluid restriction).  · Use a cool mist vaporizer. This  humidifies the air and makes it easier for you to clear your chest when you cough.  · If you have a home nebulizer and oxygen, continue to use them as told by your health care provider.  · Keep all follow-up visits as told by your health care provider. This is important.  How is this prevented?  · Stay up-to-date on pneumococcal and influenza (flu) vaccines. A flu shot is recommended every year to help prevent exacerbations.  · Do not use any products that contain nicotine or tobacco, such as cigarettes and e-cigarettes. Quitting smoking is very important in preventing COPD from getting worse and in preventing exacerbations from happening as often. If you need help quitting, ask your health care provider.  · Follow all instructions for pulmonary rehabilitation after a recent exacerbation. This can help prevent future exacerbations.  · Work with your health care provider to develop and follow an action plan. This tells you what steps to take when you experience certain symptoms.  Contact a health care provider if:  · You have a worsening of your regular COPD symptoms.  Get help right away if:  · You have worsening shortness of breath, even when resting.  · You have trouble talking.  · You have severe chest pain.  · You cough up blood.  · You have a fever.  · You have weakness, vomit repeatedly, or faint.  · You feel confused.  · You are not able to sleep because of your symptoms.  · You have trouble doing daily activities.  Summary  · COPD exacerbations are episodes when breathing symptoms become much worse and require extra treatment above your normal treatment.  · Exacerbations can be severe and even life threatening. Frequent COPD exacerbations can cause further damage to your lungs.  · COPD exacerbations are usually triggered by infections such as the flu, colds, and even pneumonia.  · Treatment for this condition depends on the severity and cause of the symptoms. You may need to be admitted to a hospital for  treatment.  · Quitting smoking is very important to prevent COPD from getting worse and to prevent exacerbations from happening as often.  This information is not intended to replace advice given to you by your health care provider. Make sure you discuss any questions you have with your health care provider.  Document Revised: 11/30/2018 Document Reviewed: 01/22/2018  [employer] Patient Education © 2021 [employer]      Managing the Challenge of Quitting Smoking  Quitting smoking is a physical and mental challenge. You will face cravings, withdrawal symptoms, and temptation. Before quitting, work with your health care provider to make a plan that can help you manage quitting. Preparation can help you quit and keep you from giving in.  How to manage lifestyle changes  Managing stress  Stress can make you want to smoke, and wanting to smoke may cause stress. It is important to find ways to manage your stress. You might try some of the following:  · Practice relaxation techniques.  ? Breathe slowly and deeply, in through your nose and out through your mouth.  ? Listen to music.  ? Soak in a bath or take a shower.  ? Imagine a peaceful place or vacation.  · Get some support.  ? Talk with family or friends about your stress.  ? Join a support group.  ? Talk with a counselor or therapist.  · Get some physical activity.  ? Go for a walk, run, or bike ride.  ? Play a favorite sport.  ? Practice yoga.    Medicines  Talk with your health care provider about medicines that might help you deal with cravings and make quitting easier for you.  Relationships  Social situations can be difficult when you are quitting smoking. To manage this, you can:  · Avoid parties and other social situations where people might be smoking.  · Avoid alcohol.  · Leave right away if you have the urge to smoke.  · Explain to your family and friends that you are quitting smoking. Ask for support and let them know you might be a bit grumpy.  · Plan  activities where smoking is not an option.  General instructions  Be aware that many people gain weight after they quit smoking. However, not everyone does. To keep from gaining weight, have a plan in place before you quit and stick to the plan after you quit. Your plan should include:  · Having healthy snacks. When you have a craving, it may help to:  ? Eat popcorn, carrots, celery, or other cut vegetables.  ? Chew sugar-free gum.  · Changing how you eat.  ? Eat small portion sizes at meals.  ? Eat 4-6 small meals throughout the day instead of 1-2 large meals a day.  ? Be mindful when you eat. Do not watch television or do other things that might distract you as you eat.  · Exercising regularly.  ? Make time to exercise each day. If you do not have time for a long workout, do short bouts of exercise for 5-10 minutes several times a day.  ? Do some form of strengthening exercise, such as weight lifting.  ? Do some exercise that gets your heart beating and causes you to breathe deeply, such as walking fast, running, swimming, or biking. This is very important.  · Drinking plenty of water or other low-calorie or no-calorie drinks. Drink 6-8 glasses of water daily.    How to recognize withdrawal symptoms  Your body and mind may experience discomfort as you try to get used to not having nicotine in your system. These effects are called withdrawal symptoms. They may include:  · Feeling hungrier than normal.  · Having trouble concentrating.  · Feeling irritable or restless.  · Having trouble sleeping.  · Feeling depressed.  · Craving a cigarette.  To manage withdrawal symptoms:  · Avoid places, people, and activities that trigger your cravings.  · Remember why you want to quit.  · Get plenty of sleep.  · Avoid coffee and other caffeinated drinks. These may worsen some of your symptoms.  These symptoms may surprise you. But be assured that they are normal to have when quitting smoking.  How to manage cravings  Come up with  a plan for how to deal with your cravings. The plan should include the following:  · A definition of the specific situation you want to deal with.  · An alternative action you will take.  · A clear idea for how this action will help.  · The name of someone who might help you with this.  Cravings usually last for 5-10 minutes. Consider taking the following actions to help you with your plan to deal with cravings:  · Keep your mouth busy.  ? Chew sugar-free gum.  ? Suck on hard candies or a straw.  ? Brush your teeth.  · Keep your hands and body busy.  ? Change to a different activity right away.  ? Squeeze or play with a ball.  ? Do an activity or a hobby, such as making bead jewelry, practicing needlepoint, or working with wood.  ? Mix up your normal routine.  ? Take a short exercise break. Go for a quick walk or run up and down stairs.  · Focus on doing something kind or helpful for someone else.  · Call a friend or family member to talk during a craving.  · Join a support group.  · Contact a quitline.  Where to find support  To get help or find a support group:  · Call the National [employer] [place]'s Smoking Quitline: 3-638-QUIT NOW (106-8016)  · Visit the website of the Substance Abuse and Mental Health Services Administration: www.samhsa.gov  · Text QUIT to SmokefreeTXT: 647089  Where to find more information  Visit these websites to find more information on quitting smoking:  · [employer] [place]: www.smokefree.gov  · American Lung Association: www.lung.org  · American Cancer Society: www.cancer.org  · Centers for Disease Control and Prevention: www.cdc.gov  · American Heart Association: www.heart.org  Contact a health care provider if:  · You want to change your plan for quitting.  · The medicines you are taking are not helping.  · Your eating feels out of control or you cannot sleep.  Get help right away if:  · You feel depressed or become very anxious.  Summary  · Quitting smoking is a physical and  mental challenge. You will face cravings, withdrawal symptoms, and temptation to smoke again. Preparation can help you as you go through these challenges.  · Try different techniques to manage stress, handle social situations, and prevent weight gain.  · You can deal with cravings by keeping your mouth busy (such as by chewing gum), keeping your hands and body busy, calling family or friends, or contacting a quitline for people who want to quit smoking.  · You can deal with withdrawal symptoms by avoiding places where people smoke, getting plenty of rest, and avoiding drinks with caffeine.  This information is not intended to replace advice given to you by your health care provider. Make sure you discuss any questions you have with your health care provider.  Document Revised: 10/06/2020 Document Reviewed: 10/06/2020  [employer] Patient Education © 2021 [employer]

## 2021-12-21 NOTE — PRE-PROCEDURE INSTRUCTIONS
Pt. Wife instructed on laxative and skin prep, pre-op meds,clear liquid diet. Ok to take all meds except Plavix, a.m. of procedure.         Fully vaccinated

## 2021-12-23 NOTE — ANESTHESIA POSTPROCEDURE EVALUATION
Patient: Domingo Greene    Procedure Summary     Date: 12/23/21 Room / Location: [facility] ENDOSCOPY 1 / [facility] ENDOSCOPY    Anesthesia Start: 1203 Anesthesia Stop: 1251    Procedures:       ESOPHAGOGASTRODUODENOSCOPY with biopsies (N/A )      COLONOSCOPY with polypectomy and descending clip (N/A ) Diagnosis:       Epigastric pain      Unintended weight loss      Change in bowel habit      (Epigastric pain [R10.13])      (Unintended weight loss [R63.4])      (Change in bowel habit [R19.4])    Surgeons: Jarvis Dominique MD Provider: Saqib Velasquez MD    Anesthesia Type: general, MAC ASA Status: 3          Anesthesia Type: general, MAC    Vitals  Vitals Value Taken Time   /88 12/23/21 1306   Temp 36.5 °C (97.7 °F) 12/23/21 1305   Pulse 75 12/23/21 1308   Resp 16 12/23/21 1305   SpO2 100 % 12/23/21 1308   Vitals shown include unvalidated device data.        Post Anesthesia Care and Evaluation    Patient location during evaluation: bedside  Patient participation: complete - patient participated  Level of consciousness: awake  Pain management: adequate  Airway patency: patent  Anesthetic complications: No anesthetic complications  PONV Status: none  Cardiovascular status: acceptable  Respiratory status: acceptable  Hydration status: acceptable    Comments: An Anesthesiologist personally participated in the most demanding procedures (including induction and emergence if applicable) in the anesthesia plan, monitored the course of anesthesia administration at frequent intervals and remained physically present and available for immediate diagnosis and treatment of emergencies.
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
## 2021-12-23 NOTE — ANESTHESIA PREPROCEDURE EVALUATION
Anesthesia Evaluation     Patient summary reviewed and Nursing notes reviewed   NPO Solid Status: > 6 hours  NPO Liquid Status: > 6 hours           Airway   Mallampati: II  TM distance: >3 FB  Dental      Pulmonary    (+) a smoker Current, COPD moderate, asthma,decreased breath sounds,   Cardiovascular - normal exam  Exercise tolerance: good (4-7 METS)    (+) hypertension,       Neuro/Psych  GI/Hepatic/Renal/Endo    (+)  GERD,      Musculoskeletal     Abdominal    Substance History      OB/GYN          Other                        Anesthesia Plan    ASA 3     general and MAC     intravenous induction     Anesthetic plan, all risks, benefits, and alternatives have been provided, discussed and informed consent has been obtained with: patient.

## 2021-12-27 PROBLEM — R06.09 CHRONIC DYSPNEA: Status: ACTIVE | Noted: 2021-01-01

## 2021-12-27 PROBLEM — R06.2 WHEEZING: Status: ACTIVE | Noted: 2021-01-01

## 2021-12-27 PROBLEM — R93.1 ABNORMAL ECHOCARDIOGRAM: Status: ACTIVE | Noted: 2021-01-01

## 2021-12-27 PROBLEM — J44.9 CHRONIC OBSTRUCTIVE PULMONARY DISEASE (HCC): Status: ACTIVE | Noted: 2021-01-01

## 2021-12-27 PROBLEM — R49.0 HOARSENESS: Status: ACTIVE | Noted: 2021-01-01

## 2021-12-27 PROBLEM — R06.89 AIRWAY CLEARANCE IMPAIRMENT: Status: ACTIVE | Noted: 2021-01-01

## 2021-12-27 PROBLEM — N28.9 RENAL LESION: Status: ACTIVE | Noted: 2021-01-01

## 2021-12-27 PROBLEM — T17.500A MUCUS PLUGGING OF BRONCHI: Status: ACTIVE | Noted: 2021-01-01

## 2021-12-29 NOTE — TELEPHONE ENCOUNTER
Spoke to pt and spouse. Informed of Kathleen MINA and Dr. Dominique result note. Verified understanding. H. Pylori Breath test instructions mailed to pt. 1 year Colon recall placed.

## 2021-12-29 NOTE — TELEPHONE ENCOUNTER
----- Message from LEOPOLDO Gonzalez sent at 12/29/2021 11:50 AM EST -----  Pylera equivalent sent in, please see below orders as well.  Please mail instructions for H. pylori breath test.  If abdominal pain and GI complaints are resolved after antibiotics patient may follow-up as needed, please schedule follow-up if patient continues to have any complaints

## 2021-12-30 NOTE — TELEPHONE ENCOUNTER
[facility] called stating patient insurance would not cover one of the medications sent in to treat h.pylori. I completed PA and patient was approved. Due to patient's insurance he will still have to pay around $65.00 instead of $105. Pharmacy states they are losing money by processing so they will call the patient and 1. See if patient is willing to pay for the medication and 2. See if patient is willing to transfer to another pharmacy which could reduce the amount that he pays. [facility] will call out office back with update. Approval has been scanned into chart.

## 2022-01-01 ENCOUNTER — HOSPITAL ENCOUNTER (OUTPATIENT)
Dept: MRI IMAGING | Facility: HOSPITAL | Age: 72
Discharge: HOME | End: 2022-02-01
Admitting: NURSE PRACTITIONER

## 2022-01-01 ENCOUNTER — OFFICE VISIT (OUTPATIENT)
Dept: GASTROENTEROLOGY | Facility: CLINIC | Age: 72
End: 2022-01-01

## 2022-01-01 ENCOUNTER — OFFICE VISIT (OUTPATIENT)
Dept: PULMONOLOGY | Facility: CLINIC | Age: 72
End: 2022-01-01

## 2022-01-01 ENCOUNTER — LAB (OUTPATIENT)
Dept: LAB | Facility: HOSPITAL | Age: 72
End: 2022-01-01

## 2022-01-01 ENCOUNTER — APPOINTMENT (OUTPATIENT)
Dept: MRI IMAGING | Facility: HOSPITAL | Age: 72
End: 2022-01-01

## 2022-01-01 ENCOUNTER — HOSPITAL ENCOUNTER (OUTPATIENT)
Dept: CT IMAGING | Facility: HOSPITAL | Age: 72
Discharge: HOME | End: 2022-02-07
Admitting: INTERNAL MEDICINE

## 2022-01-01 ENCOUNTER — TELEPHONE (OUTPATIENT)
Dept: GASTROENTEROLOGY | Facility: CLINIC | Age: 72
End: 2022-01-01

## 2022-01-01 ENCOUNTER — HOSPITAL ENCOUNTER (OUTPATIENT)
Dept: RESPIRATORY THERAPY | Facility: HOSPITAL | Age: 72
Discharge: HOME | End: 2022-06-30
Admitting: INTERNAL MEDICINE

## 2022-01-01 VITALS
SYSTOLIC BLOOD PRESSURE: 129 MMHG | BODY MASS INDEX: 17.76 KG/M2 | DIASTOLIC BLOOD PRESSURE: 62 MMHG | HEART RATE: 68 BPM | OXYGEN SATURATION: 94 % | WEIGHT: 134 LBS | HEIGHT: 73 IN

## 2022-01-01 VITALS
HEIGHT: 73 IN | SYSTOLIC BLOOD PRESSURE: 97 MMHG | TEMPERATURE: 98.4 F | DIASTOLIC BLOOD PRESSURE: 64 MMHG | WEIGHT: 137 LBS | RESPIRATION RATE: 19 BRPM | HEART RATE: 73 BPM | OXYGEN SATURATION: 97 % | BODY MASS INDEX: 18.16 KG/M2

## 2022-01-01 VITALS
WEIGHT: 132.4 LBS | SYSTOLIC BLOOD PRESSURE: 116 MMHG | DIASTOLIC BLOOD PRESSURE: 61 MMHG | OXYGEN SATURATION: 95 % | HEIGHT: 73 IN | BODY MASS INDEX: 17.55 KG/M2 | HEART RATE: 73 BPM

## 2022-01-01 DIAGNOSIS — R91.1 LUNG NODULE: ICD-10-CM

## 2022-01-01 DIAGNOSIS — D12.6 TUBULOVILLOUS ADENOMA OF COLON: ICD-10-CM

## 2022-01-01 DIAGNOSIS — Z72.0 TOBACCO ABUSE: ICD-10-CM

## 2022-01-01 DIAGNOSIS — R42 DIZZINESS: ICD-10-CM

## 2022-01-01 DIAGNOSIS — J43.2 CENTRILOBULAR EMPHYSEMA: Primary | ICD-10-CM

## 2022-01-01 DIAGNOSIS — R12 HEARTBURN: ICD-10-CM

## 2022-01-01 DIAGNOSIS — R63.4 UNINTENDED WEIGHT LOSS: ICD-10-CM

## 2022-01-01 DIAGNOSIS — Z86.19 HISTORY OF HELICOBACTER PYLORI INFECTION: ICD-10-CM

## 2022-01-01 DIAGNOSIS — R10.84 GENERALIZED ABDOMINAL PAIN: ICD-10-CM

## 2022-01-01 DIAGNOSIS — E43 SEVERE MALNUTRITION: ICD-10-CM

## 2022-01-01 DIAGNOSIS — R11.0 NAUSEA: ICD-10-CM

## 2022-01-01 DIAGNOSIS — R10.13 EPIGASTRIC PAIN: ICD-10-CM

## 2022-01-01 DIAGNOSIS — K29.70 GASTRITIS, HELICOBACTER PYLORI: ICD-10-CM

## 2022-01-01 DIAGNOSIS — N28.9 RENAL LESION: ICD-10-CM

## 2022-01-01 DIAGNOSIS — D12.6 TUBULAR ADENOMA OF COLON: ICD-10-CM

## 2022-01-01 DIAGNOSIS — K59.00 CONSTIPATION, UNSPECIFIED CONSTIPATION TYPE: ICD-10-CM

## 2022-01-01 DIAGNOSIS — J43.2 CENTRILOBULAR EMPHYSEMA: ICD-10-CM

## 2022-01-01 DIAGNOSIS — K29.70 HELICOBACTER PYLORI GASTRITIS: Primary | ICD-10-CM

## 2022-01-01 DIAGNOSIS — B96.81 HELICOBACTER PYLORI GASTRITIS: Primary | ICD-10-CM

## 2022-01-01 DIAGNOSIS — B96.81 GASTRITIS, HELICOBACTER PYLORI: ICD-10-CM

## 2022-01-01 DIAGNOSIS — R10.84 GENERALIZED ABDOMINAL PAIN: Primary | ICD-10-CM

## 2022-01-01 LAB
CREAT BLDA-MCNC: 1 MG/DL
UREA BREATH TEST QL: NEGATIVE

## 2022-01-01 PROCEDURE — A9577 INJ MULTIHANCE: HCPCS | Performed by: NURSE PRACTITIONER

## 2022-01-01 PROCEDURE — 99214 OFFICE O/P EST MOD 30 MIN: CPT

## 2022-01-01 PROCEDURE — 82565 ASSAY OF CREATININE: CPT

## 2022-01-01 PROCEDURE — 99213 OFFICE O/P EST LOW 20 MIN: CPT | Performed by: INTERNAL MEDICINE

## 2022-01-01 PROCEDURE — 94060 EVALUATION OF WHEEZING: CPT

## 2022-01-01 PROCEDURE — 94060 EVALUATION OF WHEEZING: CPT | Performed by: INTERNAL MEDICINE

## 2022-01-01 PROCEDURE — 94729 DIFFUSING CAPACITY: CPT | Performed by: INTERNAL MEDICINE

## 2022-01-01 PROCEDURE — 0 GADOBENATE DIMEGLUMINE 529 MG/ML SOLUTION: Performed by: NURSE PRACTITIONER

## 2022-01-01 PROCEDURE — 71250 CT THORAX DX C-: CPT

## 2022-01-01 PROCEDURE — 94729 DIFFUSING CAPACITY: CPT

## 2022-01-01 PROCEDURE — 94726 PLETHYSMOGRAPHY LUNG VOLUMES: CPT | Performed by: INTERNAL MEDICINE

## 2022-01-01 PROCEDURE — 99213 OFFICE O/P EST LOW 20 MIN: CPT

## 2022-01-01 PROCEDURE — 83013 H PYLORI (C-13) BREATH: CPT

## 2022-01-01 PROCEDURE — 74183 MRI ABD W/O CNTR FLWD CNTR: CPT

## 2022-01-01 PROCEDURE — 94726 PLETHYSMOGRAPHY LUNG VOLUMES: CPT

## 2022-01-01 RX ORDER — ERGOCALCIFEROL 1.25 MG/1
50000 CAPSULE ORAL WEEKLY
COMMUNITY
Start: 2022-01-01

## 2022-01-01 RX ORDER — PANTOPRAZOLE SODIUM 40 MG/1
40 TABLET, DELAYED RELEASE ORAL DAILY
COMMUNITY
Start: 2022-01-01

## 2022-01-01 RX ORDER — OMEPRAZOLE 20 MG/1
20 CAPSULE, DELAYED RELEASE ORAL DAILY
COMMUNITY
End: 2022-01-01

## 2022-01-01 RX ORDER — DICYCLOMINE HCL 20 MG
20 TABLET ORAL EVERY 6 HOURS PRN
Qty: 120 TABLET | Refills: 1 | Status: SHIPPED | OUTPATIENT
Start: 2022-01-01 | End: 2022-01-01

## 2022-01-01 RX ORDER — CYPROHEPTADINE HYDROCHLORIDE 4 MG/1
4 TABLET ORAL 2 TIMES DAILY
COMMUNITY
Start: 2022-01-01

## 2022-01-01 RX ORDER — TETRACYCLINE HYDROCHLORIDE 500 MG/1
500 CAPSULE ORAL 4 TIMES DAILY
COMMUNITY

## 2022-01-01 RX ORDER — FAMOTIDINE 40 MG/1
40 TABLET, FILM COATED ORAL
Qty: 90 TABLET | Refills: 1 | Status: SHIPPED | OUTPATIENT
Start: 2022-01-01 | End: 2022-01-01

## 2022-01-01 RX ORDER — DICYCLOMINE HCL 20 MG
20 TABLET ORAL EVERY 6 HOURS PRN
Qty: 120 TABLET | Refills: 1 | Status: SHIPPED | OUTPATIENT
Start: 2022-01-01

## 2022-01-01 RX ORDER — ALBUTEROL SULFATE 2.5 MG/3ML
2.5 SOLUTION RESPIRATORY (INHALATION) ONCE
Status: COMPLETED | OUTPATIENT
Start: 2022-01-01 | End: 2022-01-01

## 2022-01-01 RX ORDER — FAMOTIDINE 40 MG/1
TABLET, FILM COATED ORAL
Qty: 90 TABLET | Refills: 1 | Status: SHIPPED | OUTPATIENT
Start: 2022-01-01

## 2022-01-01 RX ORDER — METRONIDAZOLE 500 MG/1
500 TABLET ORAL 3 TIMES DAILY
COMMUNITY
End: 2022-01-01

## 2022-01-01 RX ORDER — DOCUSATE SODIUM 100 MG/1
100 CAPSULE, LIQUID FILLED ORAL NIGHTLY
COMMUNITY

## 2022-01-01 RX ORDER — SUCRALFATE 1 G/1
TABLET ORAL
COMMUNITY
Start: 2022-01-01

## 2022-01-01 RX ADMIN — ALBUTEROL SULFATE 2.5 MG: 2.5 SOLUTION RESPIRATORY (INHALATION) at 14:05

## 2022-01-01 RX ADMIN — GADOBENATE DIMEGLUMINE 10 ML: 529 INJECTION, SOLUTION INTRAVENOUS at 14:13

## 2022-02-09 NOTE — TELEPHONE ENCOUNTER
1.  Please make patient a follow-up appointment  2. patient has an H. pylori breath test ordered --please do to ensure that the antibiotics worked  3.  I did not order any imaging on the patient, it was ordered I believe by Barb Machado so he needs to follow-up with their office however I did not see anything worrisome on the recent MRI  4.  Is patient still taking Protonix daily?  It appears he has refills, and he should not be taking any NSAIDs such as Advil Aleve ibuprofen or Motrin

## 2022-02-09 NOTE — TELEPHONE ENCOUNTER
Patent's spouse called wanting the results of the imaging completed on the patient. Patient is having abd pain all the time. Patient is scared to eat as it makes the pain worse. Please advise

## 2022-02-10 NOTE — TELEPHONE ENCOUNTER
I have scheduled the patient a f/u w/ Melissa since she had sooner appts for next week. The patient has been taking the Protonix everyday so no refills needs. The patient has not been taking any NSAIDS but recently was taken off all pain meds and referred to

## 2022-02-17 NOTE — PATIENT INSTRUCTIONS
Miralax start with 1 cap full and titrate that up or down as needed.   Increase water intake.   Colace to help soften stool and make bowel movements easier.   Complete H. Pylori breath test as soon as he is off PPI (Omeprazole) for 14 days. No appointment needed, just walk in to hospital lab to have test completed.
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
## 2022-03-11 NOTE — TELEPHONE ENCOUNTER
----- Message from LEOPOLDO Gonzalez sent at 3/10/2022 10:44 PM EST -----  This is normal, please notify pt and see if abd pain is improved? He may f/u with me or Melissa if still having issues. (Pt has seen both of us).

## 2022-03-11 NOTE — TELEPHONE ENCOUNTER
I have spoken with the patient's spouse and I gave the results. Per spouse the patient is still having abd pain. I scheduled a f/u for next week w/ Melissa.
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
## 2022-03-18 NOTE — PATIENT INSTRUCTIONS
Pantoprazole (Protonix) - take every morning, 30 minutes before eating, on an empty stomach  Famotidine (Pepcid) - take at night before bed  Sucralfate (Carafate) - this medicine was prescribed by your PCP, you can take this before meals  Dicyclomine (Bentyl) - this is for abdominal cramping/pain, you can take 1 every 6 hours as needed

## 2022-03-18 NOTE — PROGRESS NOTES
Primary Care Provider  Lucero Jones MD   Referring Provider  No ref. provider found      Patient Complaint  Emphysema, COPD, Follow-up (2-3 Month ), and Shortness of Breath      Subjective          Domingo Greene presents to [facility]      History of Presenting Illness  Domingo Greene is a 71 y.o. male  presents to [facility]INE for follow-up for COPD.  The patient was seen last in August of last year.  The patient reports stable dyspnea on exertion, cough, and wheeze.  No history of exacerbations.  The patient is currently on Cipro nebulized therapy.  He was also prescribed duo nebulization but he did not use any lately.  The patient continues to smoke about a pack a day; he used to smoke 3 packs a day 2 years ago.  The patient had recent upper and lower endoscopy: No malignancy reported, but multiple colonic polyps were found and removed.  Continues to have low appetite.  The patient is able to perform his ADLs without difficulty.    I have personally reviewed the review of systems, past family, social, medical and surgical histories; and agree with their findings.      Review of Systems  Constitutional symptoms:  Denied complaints   Ear, nose, throat: Denied complaints  Cardiovascular:  Denied complaints  Respiratory: see above  Gastrointestinal: Denied complaints  Musculoskeletal: Denied complaints        Family History   Problem Relation Age of Onset   • Cancer Mother    • Cancer Father    • Colon cancer Father    • Hypertension Sister    • Cancer Sister    • Asthma Brother    • Cancer Brother    • Diabetes Brother    • Emphysema Brother    • Heart failure Brother    • Hypertension Brother    • Malig Hyperthermia Neg Hx         Social History     Socioeconomic History   • Marital status:    Tobacco Use   • Smoking status: Current Every Day Smoker     Packs/day: 1.00     Types: Cigarettes     Start date:  1962   • Smokeless tobacco: Never Used   Vaping Use   • Vaping Use: Never used   Substance and Sexual Activity   • Alcohol use: Not Currently   • Drug use: Never        Past Medical History:   Diagnosis Date   • Asthma, extrinsic    • COPD (chronic obstructive pulmonary disease) ([facility])    • Erectile dysfunction    • Foot drop, left    • GERD (gastroesophageal reflux disease)    • Hypertension    • Stroke ([facility])     NO RESIDUAL        Immunization History   Administered Date(s) Administered   • COVID-19 (BERNIE) 03/04/2021, 11/04/2021   • Fluzone High Dose =>65 Years ([employer] ONLY) 10/15/2021   • Influenza LAIV (Nasal) 11/16/2020         No Known Allergies       Current Outpatient Medications:   •  albuterol (PROVENTIL) (2.5 MG/3ML) 0.083% nebulizer solution, Take 2.5 mg by nebulization Every 4 (Four) Hours As Needed., Disp: , Rfl:   •  clonazePAM (KlonoPIN) 0.5 MG tablet, Take 0.5 mg by mouth At Night As Needed., Disp: , Rfl:   •  clopidogrel (PLAVIX) 75 MG tablet, Take 75 mg by mouth Daily., Disp: , Rfl:   •  cyproheptadine (PERIACTIN) 4 MG tablet, Take 4 mg by mouth 2 (Two) Times a Day., Disp: , Rfl:   •  dilTIAZem CD (CARDIZEM CD) 120 MG 24 hr capsule, Take 120 mg by mouth Daily., Disp: , Rfl:   •  docusate sodium (COLACE) 100 MG capsule, Take 100 mg by mouth Every Night., Disp: , Rfl:   •  fluticasone (FLONASE) 50 MCG/ACT nasal spray, As Needed., Disp: , Rfl:   •  HYDROcodone-acetaminophen (NORCO)  MG per tablet, Take 1 tablet by mouth Every 8 (Eight) Hours As Needed., Disp: , Rfl:   •  omeprazole (priLOSEC) 20 MG capsule, Take 20 mg by mouth Daily., Disp: , Rfl:   •  pantoprazole (PROTONIX) 40 MG EC tablet, Take 40 mg by mouth Daily., Disp: , Rfl:   •  propranolol (INDERAL) 80 MG tablet, Take 80 mg by mouth 4 (Four) Times a Day As Needed., Disp: , Rfl:   •  rosuvastatin (CRESTOR) 10 MG tablet, Take 10 mg by mouth Daily., Disp: , Rfl:   •  sildenafil (REVATIO) 20 MG tablet, TAKE THREE TABLETS BY MOUTH 30  "MINTUES PRIOR TO SEXUAL INTERCOURSE, Disp: , Rfl:   •  sucralfate (CARAFATE) 1 g tablet, , Disp: , Rfl:   •  tetracycline (ACHROMYCIN,SUMYCIN) 500 MG capsule, Take 500 mg by mouth 4 (Four) Times a Day., Disp: , Rfl:   •  traZODone (DESYREL) 300 MG tablet, Take 300 mg by mouth Every Night., Disp: , Rfl:   •  vitamin D (ERGOCALCIFEROL) 1.25 MG (75874 UT) capsule capsule, Take 50,000 Units by mouth 1 (One) Time Per Week., Disp: , Rfl:          Objective     Vital Signs:   BP 97/64 (BP Location: Right arm, Patient Position: Sitting, Cuff Size: Small Adult)   Pulse 73   Temp 98.4 °F (36.9 °C) (Temporal)   Resp 19   Ht 185.4 cm (73\")   Wt 62.1 kg (137 lb)   SpO2 97% Comment: room air  BMI 18.07 kg/m²     Physical Exam  Vital Signs Reviewed   WDWN, thin, pleasant, alert, NAD.    HEENT:  PERRL, EOMI.  OP, nares clear  Neck:  Supple, no JVD, no thyromegaly  Chest: Distant breath sounds, no added sounds appreciated, no work of breathing noted  CV: RRR, no MGR, pulses 2+, equal.  Abd:  Soft, NT, ND, + BS, no HSM  EXT:  no clubbing, no cyanosis, no edema  Neuro:  A&Ox3, CN grossly intact, no focal deficits.  Skin: No rashes or lesions noted       Result Review :   I have personally reviewed the patient's medical records including labs, imaging, PFT.            Pulmonary Functions Testing Results:               Assessment and Plan      Patient Active Problem List   Diagnosis   • Dizziness   • Epigastric pain   • Unintentional weight loss   • Centrilobular emphysema (HCC)   • Severe protein-calorie malnutrition (HCC)   • Tobacco abuse   • Change in bowel habit   • Chronic obstructive pulmonary disease (HCC)   • Mucus plugging of bronchi   • Airway clearance impairment   • Chronic dyspnea   • Wheezing   • Hoarseness   • Renal lesion   • Abnormal echocardiogram           Impression and Plan:  Mr. Greene is a 71-year-old gentleman with history of COPD, group B/D, on triple therapy administered by nebulization.  The patient is " clinically stable.  He continues to smoke.  Plan:  1) continue current maintenance therapy of COPD  2) Pulmonary function test as last 1 was about 3 years ago.  3) Smoking cessation counseling provided.  I spent 5 minutes today counseling patient on the risks of smoking ,and discussed the benefits of quitting.  4) follow-up appointment in 3 months.    Medications personally reviewed    Follow Up     Patient was given instructions and counseling regarding his condition or for health maintenance advice. Please see specific information pulled into the AVS if appropriate.

## (undated) DEVICE — COLON KIT: Brand: MEDLINE INDUSTRIES, INC.

## (undated) DEVICE — SINGLE-USE BIOPSY FORCEPS: Brand: RADIAL JAW 4

## (undated) DEVICE — SOL IRRG H2O PL/BG 1000ML STRL

## (undated) DEVICE — THE SINGLE USE ETRAP – POLYP TRAP IS USED FOR SUCTION RETRIEVAL OF ENDOSCOPICALLY REMOVED POLYPS.: Brand: ETRAP

## (undated) DEVICE — SNAR E/S POLYP SNAREMASTER OVL/10MM 2.8X2300MM YEL

## (undated) DEVICE — Device: Brand: DEFENDO AIR/WATER/SUCTION AND BIOPSY VALVE

## (undated) DEVICE — EGD OR ERCP KIT: Brand: MEDLINE INDUSTRIES, INC.